# Patient Record
Sex: MALE | Race: WHITE
[De-identification: names, ages, dates, MRNs, and addresses within clinical notes are randomized per-mention and may not be internally consistent; named-entity substitution may affect disease eponyms.]

---

## 2018-02-12 ENCOUNTER — HOSPITAL ENCOUNTER (INPATIENT)
Dept: HOSPITAL 17 - NEPI | Age: 43
LOS: 9 days | Discharge: HOME HEALTH | DRG: 492 | End: 2018-02-21
Attending: SURGERY | Admitting: SURGERY
Payer: COMMERCIAL

## 2018-02-12 VITALS — WEIGHT: 187.39 LBS | BODY MASS INDEX: 26.83 KG/M2 | HEIGHT: 70 IN

## 2018-02-12 VITALS — OXYGEN SATURATION: 100 %
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
## 2018-02-12 DIAGNOSIS — S82.831B: ICD-10-CM

## 2018-02-12 DIAGNOSIS — S42.021A: ICD-10-CM
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
## 2018-02-12 DIAGNOSIS — I95.9: ICD-10-CM
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
## 2018-02-12 DIAGNOSIS — Z59.0: ICD-10-CM

## 2018-02-12 DIAGNOSIS — S82.391B: Primary | ICD-10-CM

## 2018-02-12 LAB
BASOPHILS # BLD AUTO: 0.1 TH/MM3 (ref 0–0.2)
BASOPHILS NFR BLD: 0.7 % (ref 0–2)
EOSINOPHIL # BLD: 0.1 TH/MM3 (ref 0–0.4)
EOSINOPHIL NFR BLD: 1.3 % (ref 0–4)
ERYTHROCYTE [DISTWIDTH] IN BLOOD BY AUTOMATED COUNT: 14.1 % (ref 11.6–17.2)
HCT VFR BLD CALC: 40.4 % (ref 39–51)
HGB BLD-MCNC: 14.2 GM/DL (ref 13–17)
INR PPP: 1 RATIO
LYMPHOCYTES # BLD AUTO: 3.9 TH/MM3 (ref 1–4.8)
LYMPHOCYTES NFR BLD AUTO: 37.2 % (ref 9–44)
MCH RBC QN AUTO: 31.2 PG (ref 27–34)
MCHC RBC AUTO-ENTMCNC: 35 % (ref 32–36)
MCV RBC AUTO: 89.1 FL (ref 80–100)
MONOCYTE #: 1 TH/MM3 (ref 0–0.9)
MONOCYTES NFR BLD: 9.7 % (ref 0–8)
NEUTROPHILS # BLD AUTO: 5.3 TH/MM3 (ref 1.8–7.7)
NEUTROPHILS NFR BLD AUTO: 51.1 % (ref 16–70)
PLATELET # BLD: 195 TH/MM3 (ref 150–450)
PMV BLD AUTO: 8 FL (ref 7–11)
PROTHROMBIN TIME: 10.4 SEC (ref 9.8–11.6)
RBC # BLD AUTO: 4.54 MIL/MM3 (ref 4.5–5.9)
WBC # BLD AUTO: 10.4 TH/MM3 (ref 4–11)

## 2018-02-12 PROCEDURE — 74177 CT ABD & PELVIS W/CONTRAST: CPT

## 2018-02-12 PROCEDURE — G0390 TRAUMA RESPONS W/HOSP CRITI: HCPCS

## 2018-02-12 PROCEDURE — 85730 THROMBOPLASTIN TIME PARTIAL: CPT

## 2018-02-12 PROCEDURE — 85018 HEMOGLOBIN: CPT

## 2018-02-12 PROCEDURE — 70450 CT HEAD/BRAIN W/O DYE: CPT

## 2018-02-12 PROCEDURE — 0QSJXZZ REPOSITION RIGHT FIBULA, EXTERNAL APPROACH: ICD-10-PCS | Performed by: ORTHOPAEDIC SURGERY

## 2018-02-12 PROCEDURE — 94150 VITAL CAPACITY TEST: CPT

## 2018-02-12 PROCEDURE — 86900 BLOOD TYPING SEROLOGIC ABO: CPT

## 2018-02-12 PROCEDURE — 72132 CT LUMBAR SPINE W/DYE: CPT

## 2018-02-12 PROCEDURE — 96374 THER/PROPH/DIAG INJ IV PUSH: CPT

## 2018-02-12 PROCEDURE — 0QSG35Z REPOSITION RIGHT TIBIA WITH EXTERNAL FIXATION DEVICE, PERCUTANEOUS APPROACH: ICD-10-PCS | Performed by: ORTHOPAEDIC SURGERY

## 2018-02-12 PROCEDURE — 75635 CT ANGIO ABDOMINAL ARTERIES: CPT

## 2018-02-12 PROCEDURE — C1713 ANCHOR/SCREW BN/BN,TIS/BN: HCPCS

## 2018-02-12 PROCEDURE — 86901 BLOOD TYPING SEROLOGIC RH(D): CPT

## 2018-02-12 PROCEDURE — 72170 X-RAY EXAM OF PELVIS: CPT

## 2018-02-12 PROCEDURE — 80048 BASIC METABOLIC PNL TOTAL CA: CPT

## 2018-02-12 PROCEDURE — 85025 COMPLETE CBC W/AUTO DIFF WBC: CPT

## 2018-02-12 PROCEDURE — 72129 CT CHEST SPINE W/DYE: CPT

## 2018-02-12 PROCEDURE — 90471 IMMUNIZATION ADMIN: CPT

## 2018-02-12 PROCEDURE — 99291 CRITICAL CARE FIRST HOUR: CPT

## 2018-02-12 PROCEDURE — 85610 PROTHROMBIN TIME: CPT

## 2018-02-12 PROCEDURE — 72125 CT NECK SPINE W/O DYE: CPT

## 2018-02-12 PROCEDURE — 71045 X-RAY EXAM CHEST 1 VIEW: CPT

## 2018-02-12 PROCEDURE — 71260 CT THORAX DX C+: CPT

## 2018-02-12 PROCEDURE — 85014 HEMATOCRIT: CPT

## 2018-02-12 PROCEDURE — 73590 X-RAY EXAM OF LOWER LEG: CPT

## 2018-02-12 PROCEDURE — 96375 TX/PRO/DX INJ NEW DRUG ADDON: CPT

## 2018-02-12 PROCEDURE — 76000 FLUOROSCOPY <1 HR PHYS/QHP: CPT

## 2018-02-12 PROCEDURE — 73020 X-RAY EXAM OF SHOULDER: CPT

## 2018-02-12 PROCEDURE — 73600 X-RAY EXAM OF ANKLE: CPT

## 2018-02-12 PROCEDURE — 86850 RBC ANTIBODY SCREEN: CPT

## 2018-02-12 RX ADMIN — OXYTOCIN SCH MLS/HR: 10 INJECTION, SOLUTION INTRAMUSCULAR; INTRAVENOUS at 22:00

## 2018-02-12 RX ADMIN — HYDROCODONE BITARTRATE AND ACETAMINOPHEN PRN TAB: 10; 325 TABLET ORAL at 23:39

## 2018-02-12 RX ADMIN — CLONIDINE HYDROCHLORIDE SCH MG: 0.1 INJECTION, SOLUTION EPIDURAL at 22:00

## 2018-02-12 RX ADMIN — CEFAZOLIN SODIUM SCH MLS/HR: 2 SOLUTION INTRAVENOUS at 23:00

## 2018-02-12 SDOH — ECONOMIC STABILITY - HOUSING INSECURITY: HOMELESSNESS: Z59.0
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
## 2018-02-12 NOTE — RADRPT
EXAM DATE/TIME:  02/12/2018 21:45 

 

[facility] COMPARISON:     

No previous studies available for comparison.

 

                     

INDICATIONS :     

Ex fix right ankle.

                     

 

MEDICAL HISTORY :     

None.          

 

SURGICAL HISTORY :     

None.   

 

ENCOUNTER:     

Initial                                        

 

ACUITY:     

1 day      

 

PAIN SCORE:     

Non-responsive.

 

LOCATION:     

Right  ankle.

 

FINDINGS:     

There is a comminuted distal tibial fracture and distal fibular fracture with one shaft width displac
ement.

 

CONCLUSION:     

1. Postoperative external fixation across comminuted distal tibia and fibular fractures.

 

 

 

 

 Akin Dominguez MD on February 12, 2018 at 22:40           

Board Certified Radiologist.

 This report was verified electronically.
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
## 2018-02-13 VITALS
RESPIRATION RATE: 17 BRPM | HEART RATE: 73 BPM | DIASTOLIC BLOOD PRESSURE: 79 MMHG | TEMPERATURE: 96.8 F | SYSTOLIC BLOOD PRESSURE: 126 MMHG | OXYGEN SATURATION: 100 %

## 2018-02-13 VITALS
DIASTOLIC BLOOD PRESSURE: 73 MMHG | TEMPERATURE: 95.9 F | OXYGEN SATURATION: 100 % | RESPIRATION RATE: 17 BRPM | SYSTOLIC BLOOD PRESSURE: 117 MMHG | HEART RATE: 73 BPM

## 2018-02-13 VITALS
TEMPERATURE: 96.6 F | OXYGEN SATURATION: 99 % | HEART RATE: 73 BPM | DIASTOLIC BLOOD PRESSURE: 75 MMHG | SYSTOLIC BLOOD PRESSURE: 120 MMHG | RESPIRATION RATE: 17 BRPM

## 2018-02-13 VITALS
SYSTOLIC BLOOD PRESSURE: 117 MMHG | RESPIRATION RATE: 18 BRPM | DIASTOLIC BLOOD PRESSURE: 90 MMHG | HEART RATE: 89 BPM | OXYGEN SATURATION: 97 % | TEMPERATURE: 96.3 F

## 2018-02-13 VITALS — OXYGEN SATURATION: 96 %

## 2018-02-13 VITALS
HEART RATE: 60 BPM | RESPIRATION RATE: 17 BRPM | TEMPERATURE: 96.8 F | OXYGEN SATURATION: 95 % | SYSTOLIC BLOOD PRESSURE: 116 MMHG | DIASTOLIC BLOOD PRESSURE: 70 MMHG

## 2018-02-13 VITALS — OXYGEN SATURATION: 99 %

## 2018-02-13 VITALS
OXYGEN SATURATION: 99 % | TEMPERATURE: 97.3 F | RESPIRATION RATE: 17 BRPM | HEART RATE: 74 BPM | SYSTOLIC BLOOD PRESSURE: 124 MMHG | DIASTOLIC BLOOD PRESSURE: 72 MMHG

## 2018-02-13 LAB
HCT VFR BLD CALC: 38.7 % (ref 39–51)
HGB BLD-MCNC: 13.1 GM/DL (ref 13–17)

## 2018-02-13 RX ADMIN — CLONIDINE HYDROCHLORIDE SCH MG: 0.1 INJECTION, SOLUTION EPIDURAL at 06:44

## 2018-02-13 RX ADMIN — CLONIDINE HYDROCHLORIDE SCH MG: 0.1 INJECTION, SOLUTION EPIDURAL at 21:44

## 2018-02-13 RX ADMIN — FAMOTIDINE SCH MG: 20 TABLET, FILM COATED ORAL at 08:57

## 2018-02-13 RX ADMIN — CEFAZOLIN SODIUM SCH MLS/HR: 2 SOLUTION INTRAVENOUS at 15:05

## 2018-02-13 RX ADMIN — CLONIDINE HYDROCHLORIDE SCH MG: 0.1 INJECTION, SOLUTION EPIDURAL at 13:13

## 2018-02-13 RX ADMIN — STANDARDIZED SENNA CONCENTRATE AND DOCUSATE SODIUM SCH TAB: 8.6; 5 TABLET, FILM COATED ORAL at 08:58

## 2018-02-13 RX ADMIN — VANCOMYCIN HYDROCHLORIDE SCH MLS/HR: 1 INJECTION, SOLUTION INTRAVENOUS at 00:30

## 2018-02-13 RX ADMIN — ONDANSETRON PRN MG: 2 INJECTION, SOLUTION INTRAMUSCULAR; INTRAVENOUS at 06:44

## 2018-02-13 RX ADMIN — STANDARDIZED SENNA CONCENTRATE AND DOCUSATE SODIUM SCH TAB: 8.6; 5 TABLET, FILM COATED ORAL at 21:34

## 2018-02-13 RX ADMIN — OXYTOCIN SCH MLS/HR: 10 INJECTION, SOLUTION INTRAMUSCULAR; INTRAVENOUS at 23:40

## 2018-02-13 RX ADMIN — ACETAMINOPHEN SCH MLS/HR: 10 INJECTION, SOLUTION INTRAVENOUS at 22:27

## 2018-02-13 RX ADMIN — CEFAZOLIN SODIUM SCH MLS/HR: 2 SOLUTION INTRAVENOUS at 06:44

## 2018-02-13 RX ADMIN — VANCOMYCIN HYDROCHLORIDE SCH MLS/HR: 1 INJECTION, SOLUTION INTRAVENOUS at 13:12

## 2018-02-13 RX ADMIN — HYDROCODONE BITARTRATE AND ACETAMINOPHEN PRN TAB: 10; 325 TABLET ORAL at 21:44

## 2018-02-13 RX ADMIN — VANCOMYCIN HYDROCHLORIDE SCH MLS/HR: 1 INJECTION, SOLUTION INTRAVENOUS at 23:40

## 2018-02-13 RX ADMIN — ACETAMINOPHEN SCH MLS/HR: 10 INJECTION, SOLUTION INTRAVENOUS at 15:03

## 2018-02-13 RX ADMIN — ONDANSETRON PRN MG: 2 INJECTION, SOLUTION INTRAMUSCULAR; INTRAVENOUS at 02:01

## 2018-02-13 RX ADMIN — MAGNESIUM HYDROXIDE SCH ML: 400 SUSPENSION ORAL at 08:57

## 2018-02-13 RX ADMIN — OXYTOCIN SCH MLS/HR: 10 INJECTION, SOLUTION INTRAMUSCULAR; INTRAVENOUS at 08:58

## 2018-02-13 RX ADMIN — CEFAZOLIN SODIUM SCH MLS/HR: 2 SOLUTION INTRAVENOUS at 21:44

## 2018-02-13 RX ADMIN — FAMOTIDINE SCH MG: 20 TABLET, FILM COATED ORAL at 21:43

## 2018-02-13 RX ADMIN — MAGNESIUM HYDROXIDE SCH ML: 400 SUSPENSION ORAL at 21:44

## 2018-02-13 RX ADMIN — HYDROCODONE BITARTRATE AND ACETAMINOPHEN PRN TAB: 10; 325 TABLET ORAL at 17:38
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
## 2018-02-13 NOTE — PD.ORT.PN
Subjective


Subjective Remarks


POD 1 s/p I&D with application of exfix right open distal tib fib fxs


s/p right clavicle fx


repots severe nausea.





Objective


Vitals





Vital Signs








  Date Time  Temp Pulse Resp B/P (MAP) Pulse Ox O2 Delivery O2 Flow Rate FiO2


 


2/13/18 05:05 96.8 60 17 116/70 (85) 95   


 


2/13/18 00:15 96.3 89 18 117/90 (99) 97   


 


2/12/18 23:45  50 15 122/78 (93) 99 Room Air  


 


2/12/18 23:15  83 17 125/83 (97) 99 Room Air  


 


2/12/18 23:00  98 15 128/78 (95) 99 Room Air  


 


2/12/18 22:45  98 15 112/71 (85) 99 Room Air  


 


2/12/18 22:30 97.9 100 15 131/76 (94) 99 Nasal Cannula 2 


 


2/12/18 22:20 97.9 100 15 126/70 (88) 99 Nasal Cannula 2 


 


2/12/18 22:19     100  2.00 














I/O      


 


 2/12/18 2/12/18 2/12/18 2/13/18 2/13/18 2/13/18





 07:00 15:00 23:00 07:00 15:00 23:00


 


Intake Total   1500 ml 480 ml  


 


Output Total   100 ml 425 ml  


 


Balance   1400 ml 55 ml  


 


      


 


Intake Oral    480 ml  


 


Other   1500 ml   


 


Output Urine Total   0 ml 425 ml  


 


Estimated Blood Loss   100 ml   


 


# Voids   0   


 


# Bowel Movements    0  








Result Diagram:  


2/12/18 1920





Other Results





Laboratory Tests








Test


  2/12/18


19:20


 


Prothromb Time International


Ratio 1.0 RATIO 


 


 


Prothrombin Time


  10.4 SEC


(9.8-11.6)








Imaging





Last 24 hours Impressions








Aorta w/Runoff CTA 2/12/18 1937 Signed





Impressions: 





 Service Date/Time:  Monday, February 12, 2018 19:44 - CONCLUSION:  1. 

Posterior 





 tibial artery appears to be occluded by a bone fragment. There is questionable 





 trace flow in dorsalis pedis artery. Peroneal artery not well visualized. Exam 





 is limited by poor contrast opacification in the distal lower extremities. 





 Results therefore not definitive. Normal flow noted above the fracture on the 





 right. Left side normal.     Akin Dominguez MD 


 


Thoracic Spine CT 2/12/18 1920 Signed





Impressions: 





 Service Date/Time:  Monday, February 12, 2018 19:44 - CONCLUSION:  1. No acute 





 findings identified within the thoracic spine.     Akin Dominguez MD 


 


Pelvis X-Ray 2/12/18 1920 Signed





Impressions: 





 Service Date/Time:  Monday, February 12, 2018 19:18 - CONCLUSION:  1. No acute 





 findings.     Akin Dominguez MD 


 


Lumbar Spine CT 2/12/18 1920 Signed





Impressions: 





 Service Date/Time:  Monday, February 12, 2018 19:44 - CONCLUSION:  1. No acute 





 fracture. Mild central canal stenosis at L4-5 with broad-based mild disc 





 protrusion. No spondylolisthesis.     Akin Dominguez MD 


 


Head CT 2/12/18 1920 Signed





Impressions: 





 Service Date/Time:  Monday, February 12, 2018 19:36 - CONCLUSION:  1. No acute 





 intracranial abnormality. Fluid in the paranasal sinuses.     Akin Dominguez MD 


 


Chest CT 2/12/18 1920 Signed





Impressions: 





 Service Date/Time:  Monday, February 12, 2018 19:44 - CONCLUSION:  1. Right 





 clavicle fracture. Otherwise negative for intrathoracic traumatic injury.     





 Akin Dominguez MD 


 


Cervical Spine CT 2/12/18 1920 Signed





Impressions: 





 Service Date/Time:  Monday, February 12, 2018 19:36 - CONCLUSION:  1. No acute 





 findings.     Akin Dominguez MD 


 


Abdomen/Pelvis CT 2/12/18 1920 Signed





Impressions: 





 Service Date/Time:  Monday, February 12, 2018 19:44 - CONCLUSION:  1. Negative 





 for acute traumatic injury within the abdomen and pelvis.     Akin Dominguez MD 








Objective Remarks


RLE: dressings around proximal pin sites are blood soaked. remaining dressings 

are clean and dry. intact. NVI. 


RUE: moderate pain overmidshaft clavicle. nvi





Assessment & Plan


Assessment and Plan


1) Right Midshaft clavicle fx - nonop


   -NWB


   -will monitor and treat nonop





2) Right distal Tibfib Fxs s/p I&D and exfix application - POD 1


   -NWB


   -elevate


   -Toradol


   -pin care BID


   -npo after MN


   -will re-eval for surgery tomorrow AM











Jalil Jacobs/First Assist PA Feb 13, 2018 07:23
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
## 2018-02-14 VITALS
HEART RATE: 76 BPM | RESPIRATION RATE: 17 BRPM | TEMPERATURE: 97.2 F | SYSTOLIC BLOOD PRESSURE: 117 MMHG | OXYGEN SATURATION: 98 % | DIASTOLIC BLOOD PRESSURE: 67 MMHG

## 2018-02-14 VITALS
HEART RATE: 69 BPM | TEMPERATURE: 97 F | SYSTOLIC BLOOD PRESSURE: 128 MMHG | DIASTOLIC BLOOD PRESSURE: 77 MMHG | OXYGEN SATURATION: 99 % | RESPIRATION RATE: 17 BRPM

## 2018-02-14 VITALS
SYSTOLIC BLOOD PRESSURE: 133 MMHG | TEMPERATURE: 97.1 F | DIASTOLIC BLOOD PRESSURE: 74 MMHG | OXYGEN SATURATION: 97 % | HEART RATE: 68 BPM | RESPIRATION RATE: 18 BRPM

## 2018-02-14 VITALS
OXYGEN SATURATION: 98 % | RESPIRATION RATE: 17 BRPM | DIASTOLIC BLOOD PRESSURE: 67 MMHG | SYSTOLIC BLOOD PRESSURE: 126 MMHG | TEMPERATURE: 96.9 F | HEART RATE: 76 BPM

## 2018-02-14 VITALS
OXYGEN SATURATION: 97 % | RESPIRATION RATE: 19 BRPM | TEMPERATURE: 97.8 F | DIASTOLIC BLOOD PRESSURE: 67 MMHG | SYSTOLIC BLOOD PRESSURE: 128 MMHG | HEART RATE: 76 BPM

## 2018-02-14 VITALS
OXYGEN SATURATION: 97 % | DIASTOLIC BLOOD PRESSURE: 73 MMHG | HEART RATE: 70 BPM | TEMPERATURE: 96.5 F | RESPIRATION RATE: 17 BRPM | SYSTOLIC BLOOD PRESSURE: 121 MMHG

## 2018-02-14 VITALS
HEART RATE: 63 BPM | DIASTOLIC BLOOD PRESSURE: 65 MMHG | SYSTOLIC BLOOD PRESSURE: 115 MMHG | OXYGEN SATURATION: 98 % | RESPIRATION RATE: 17 BRPM | TEMPERATURE: 96.2 F

## 2018-02-14 VITALS — OXYGEN SATURATION: 97 %

## 2018-02-14 VITALS — OXYGEN SATURATION: 98 %

## 2018-02-14 LAB
BASOPHILS # BLD AUTO: 0.1 TH/MM3 (ref 0–0.2)
BASOPHILS NFR BLD: 0.8 % (ref 0–2)
BUN SERPL-MCNC: 9 MG/DL (ref 7–18)
CALCIUM SERPL-MCNC: 8.2 MG/DL (ref 8.5–10.1)
CHLORIDE SERPL-SCNC: 105 MEQ/L (ref 98–107)
CREAT SERPL-MCNC: 0.93 MG/DL (ref 0.6–1.3)
EOSINOPHIL # BLD: 0 TH/MM3 (ref 0–0.4)
EOSINOPHIL NFR BLD: 0.5 % (ref 0–4)
ERYTHROCYTE [DISTWIDTH] IN BLOOD BY AUTOMATED COUNT: 13.9 % (ref 11.6–17.2)
GFR SERPLBLD BASED ON 1.73 SQ M-ARVRAT: 89 ML/MIN (ref 89–?)
GLUCOSE SERPL-MCNC: 99 MG/DL (ref 74–106)
HCO3 BLD-SCNC: 28.1 MEQ/L (ref 21–32)
HCT VFR BLD CALC: 34.7 % (ref 39–51)
HGB BLD-MCNC: 11.8 GM/DL (ref 13–17)
LYMPHOCYTES # BLD AUTO: 2.8 TH/MM3 (ref 1–4.8)
LYMPHOCYTES NFR BLD AUTO: 34.3 % (ref 9–44)
MCH RBC QN AUTO: 31 PG (ref 27–34)
MCHC RBC AUTO-ENTMCNC: 34.1 % (ref 32–36)
MCV RBC AUTO: 91 FL (ref 80–100)
MONOCYTE #: 1 TH/MM3 (ref 0–0.9)
MONOCYTES NFR BLD: 12.6 % (ref 0–8)
NEUTROPHILS # BLD AUTO: 4.2 TH/MM3 (ref 1.8–7.7)
NEUTROPHILS NFR BLD AUTO: 51.8 % (ref 16–70)
PLATELET # BLD: 172 TH/MM3 (ref 150–450)
PMV BLD AUTO: 8.3 FL (ref 7–11)
RBC # BLD AUTO: 3.82 MIL/MM3 (ref 4.5–5.9)
SODIUM SERPL-SCNC: 139 MEQ/L (ref 136–145)
WBC # BLD AUTO: 8 TH/MM3 (ref 4–11)

## 2018-02-14 RX ADMIN — HYDROCODONE BITARTRATE AND ACETAMINOPHEN PRN TAB: 10; 325 TABLET ORAL at 19:48

## 2018-02-14 RX ADMIN — HYDROCODONE BITARTRATE AND ACETAMINOPHEN PRN TAB: 10; 325 TABLET ORAL at 06:33

## 2018-02-14 RX ADMIN — STANDARDIZED SENNA CONCENTRATE AND DOCUSATE SODIUM SCH TAB: 8.6; 5 TABLET, FILM COATED ORAL at 20:49

## 2018-02-14 RX ADMIN — CEFAZOLIN SODIUM SCH MLS/HR: 2 SOLUTION INTRAVENOUS at 16:02

## 2018-02-14 RX ADMIN — MAGNESIUM HYDROXIDE SCH ML: 400 SUSPENSION ORAL at 19:47

## 2018-02-14 RX ADMIN — CLONIDINE HYDROCHLORIDE SCH MG: 0.1 INJECTION, SOLUTION EPIDURAL at 13:12

## 2018-02-14 RX ADMIN — ACETAMINOPHEN SCH MLS/HR: 10 INJECTION, SOLUTION INTRAVENOUS at 06:33

## 2018-02-14 RX ADMIN — HYDROCODONE BITARTRATE AND ACETAMINOPHEN PRN TAB: 10; 325 TABLET ORAL at 22:31

## 2018-02-14 RX ADMIN — OXYTOCIN SCH MLS/HR: 10 INJECTION, SOLUTION INTRAMUSCULAR; INTRAVENOUS at 20:49

## 2018-02-14 RX ADMIN — OXYTOCIN SCH MLS/HR: 10 INJECTION, SOLUTION INTRAMUSCULAR; INTRAVENOUS at 11:30

## 2018-02-14 RX ADMIN — CEFAZOLIN SODIUM SCH MLS/HR: 2 SOLUTION INTRAVENOUS at 05:47

## 2018-02-14 RX ADMIN — ENOXAPARIN SODIUM SCH MG: 30 INJECTION SUBCUTANEOUS at 09:10

## 2018-02-14 RX ADMIN — CLONIDINE HYDROCHLORIDE SCH MG: 0.1 INJECTION, SOLUTION EPIDURAL at 05:47

## 2018-02-14 RX ADMIN — MAGNESIUM HYDROXIDE SCH ML: 400 SUSPENSION ORAL at 09:10

## 2018-02-14 RX ADMIN — STANDARDIZED SENNA CONCENTRATE AND DOCUSATE SODIUM SCH TAB: 8.6; 5 TABLET, FILM COATED ORAL at 09:10

## 2018-02-14 RX ADMIN — FAMOTIDINE SCH MG: 20 TABLET, FILM COATED ORAL at 19:47

## 2018-02-14 RX ADMIN — ENOXAPARIN SODIUM SCH MG: 30 INJECTION SUBCUTANEOUS at 19:50

## 2018-02-14 RX ADMIN — HYDROCODONE BITARTRATE AND ACETAMINOPHEN PRN TAB: 10; 325 TABLET ORAL at 02:36

## 2018-02-14 RX ADMIN — ONDANSETRON PRN MG: 2 INJECTION, SOLUTION INTRAMUSCULAR; INTRAVENOUS at 02:36

## 2018-02-14 RX ADMIN — FAMOTIDINE SCH MG: 20 TABLET, FILM COATED ORAL at 09:10
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
## 2018-02-14 NOTE — PD.ORT.PN
Subjective


Subjective Remarks


POD 2 s/p I&D with application of exfix right open distal tib fib fxs


s/p right clavicle fx


repots that nausea is improving





Objective


Vitals





Vital Signs








  Date Time  Temp Pulse Resp B/P (MAP) Pulse Ox O2 Delivery O2 Flow Rate FiO2


 


2/14/18 04:35 97.2 76 17 117/67 (84) 98   


 


2/14/18 00:50 96.9 76 17 126/67 (86) 98   


 


2/13/18 20:25 96.8 73 17 126/79 (95) 100   


 


2/13/18 17:46     99   21


 


2/13/18 16:00 96.6 73 17 120/75 (90) 99   


 


2/13/18 15:33   17     


 


2/13/18 14:13   16     


 


2/13/18 12:00 97.3 74 17 124/72 (89) 99   


 


2/13/18 08:39     96   21


 


2/13/18 08:00 95.9 73 17 117/73 (88) 100   














I/O      


 


 2/13/18 2/13/18 2/13/18 2/14/18 2/14/18 2/14/18





 07:00 15:00 23:00 07:00 15:00 23:00


 


Intake Total 480 ml 480 ml 480 ml   


 


Output Total 425 ml     


 


Balance 55 ml 480 ml 480 ml   


 


      


 


Intake Oral 480 ml 480 ml 480 ml   


 


Output Urine Total 425 ml     


 


# Voids  3 2   


 


# Bowel Movements 0 0 0   








Result Diagram:  


2/13/18 0733





Imaging





Last 24 hours Impressions








Aorta w/Runoff CTA 2/12/18 1937 Signed





Impressions: 





 Service Date/Time:  Monday, February 12, 2018 19:44 - CONCLUSION:  1. 

Posterior 





 tibial artery appears to be occluded by a bone fragment. There is questionable 





 trace flow in dorsalis pedis artery. Peroneal artery not well visualized. Exam 





 is limited by poor contrast opacification in the distal lower extremities. 





 Results therefore not definitive. Normal flow noted above the fracture on the 





 right. Left side normal.     Akin Dominguez MD 


 


Thoracic Spine CT 2/12/18 1920 Signed





Impressions: 





 Service Date/Time:  Monday, February 12, 2018 19:44 - CONCLUSION:  1. No acute 





 findings identified within the thoracic spine.     Akin Dominguez MD 


 


Pelvis X-Ray 2/12/18 1920 Signed





Impressions: 





 Service Date/Time:  Monday, February 12, 2018 19:18 - CONCLUSION:  1. No acute 





 findings.     Akin Dominguez MD 


 


Lumbar Spine CT 2/12/18 1920 Signed





Impressions: 





 Service Date/Time:  Monday, February 12, 2018 19:44 - CONCLUSION:  1. No acute 





 fracture. Mild central canal stenosis at L4-5 with broad-based mild disc 





 protrusion. No spondylolisthesis.     Akin Dominguez MD 


 


Head CT 2/12/18 1920 Signed





Impressions: 





 Service Date/Time:  Monday, February 12, 2018 19:36 - CONCLUSION:  1. No acute 





 intracranial abnormality. Fluid in the paranasal sinuses.     Akin Dominguez MD 


 


Chest CT 2/12/18 1920 Signed





Impressions: 





 Service Date/Time:  Monday, February 12, 2018 19:44 - CONCLUSION:  1. Right 





 clavicle fracture. Otherwise negative for intrathoracic traumatic injury.     





 Akin Dominguez MD 


 


Cervical Spine CT 2/12/18 1920 Signed





Impressions: 





 Service Date/Time:  Monday, February 12, 2018 19:36 - CONCLUSION:  1. No acute 





 findings.     Akin Dominguez MD 


 


Abdomen/Pelvis CT 2/12/18 1920 Signed





Impressions: 





 Service Date/Time:  Monday, February 12, 2018 19:44 - CONCLUSION:  1. Negative 





 for acute traumatic injury within the abdomen and pelvis.     Akin Dominguez MD 








Objective Remarks


RLE: dressings around proximal pin sites are blood soaked. remaining dressings 

are clean and dry. intact. NVI. 


RUE: moderate pain overmidshaft clavicle. nvi





Assessment & Plan


Assessment and Plan


1) Right Midshaft clavicle fx - nonop


   -NWB


   -will monitor and treat nonop





2) Right distal Tibfib Fxs s/p I&D and exfix application - POD 2


   -NWB


   -elevate


   -Toradol


   -pin care BID


   -resume diet


   -npo after MN


   -will re-eval for surgery again tomorrow AM











Jalil Jacobs/First Assist PA Feb 14, 2018 06:39

## 2018-02-15 VITALS
HEART RATE: 65 BPM | OXYGEN SATURATION: 96 % | RESPIRATION RATE: 17 BRPM | SYSTOLIC BLOOD PRESSURE: 126 MMHG | TEMPERATURE: 96.3 F | DIASTOLIC BLOOD PRESSURE: 81 MMHG

## 2018-02-15 VITALS
TEMPERATURE: 97.1 F | DIASTOLIC BLOOD PRESSURE: 68 MMHG | HEART RATE: 80 BPM | RESPIRATION RATE: 18 BRPM | SYSTOLIC BLOOD PRESSURE: 117 MMHG | OXYGEN SATURATION: 95 %

## 2018-02-15 VITALS
TEMPERATURE: 96.7 F | RESPIRATION RATE: 18 BRPM | DIASTOLIC BLOOD PRESSURE: 72 MMHG | OXYGEN SATURATION: 97 % | HEART RATE: 70 BPM | SYSTOLIC BLOOD PRESSURE: 130 MMHG

## 2018-02-15 VITALS
TEMPERATURE: 96.7 F | DIASTOLIC BLOOD PRESSURE: 70 MMHG | HEART RATE: 70 BPM | SYSTOLIC BLOOD PRESSURE: 117 MMHG | OXYGEN SATURATION: 95 % | RESPIRATION RATE: 17 BRPM

## 2018-02-15 VITALS
TEMPERATURE: 98.9 F | OXYGEN SATURATION: 96 % | HEART RATE: 89 BPM | SYSTOLIC BLOOD PRESSURE: 125 MMHG | DIASTOLIC BLOOD PRESSURE: 84 MMHG | RESPIRATION RATE: 18 BRPM

## 2018-02-15 VITALS
OXYGEN SATURATION: 96 % | TEMPERATURE: 96.8 F | RESPIRATION RATE: 18 BRPM | DIASTOLIC BLOOD PRESSURE: 70 MMHG | SYSTOLIC BLOOD PRESSURE: 115 MMHG | HEART RATE: 63 BPM

## 2018-02-15 VITALS — OXYGEN SATURATION: 98 %

## 2018-02-15 RX ADMIN — CLONIDINE HYDROCHLORIDE SCH MG: 0.1 INJECTION, SOLUTION EPIDURAL at 13:14

## 2018-02-15 RX ADMIN — HYDROCODONE BITARTRATE AND ACETAMINOPHEN PRN TAB: 10; 325 TABLET ORAL at 02:18

## 2018-02-15 RX ADMIN — ONDANSETRON PRN MG: 2 INJECTION, SOLUTION INTRAMUSCULAR; INTRAVENOUS at 18:11

## 2018-02-15 RX ADMIN — GABAPENTIN SCH MG: 300 CAPSULE ORAL at 18:11

## 2018-02-15 RX ADMIN — OXYCODONE HYDROCHLORIDE AND ACETAMINOPHEN PRN TAB: 10; 325 TABLET ORAL at 13:08

## 2018-02-15 RX ADMIN — HYDROCODONE BITARTRATE AND ACETAMINOPHEN PRN TAB: 10; 325 TABLET ORAL at 08:28

## 2018-02-15 RX ADMIN — STANDARDIZED SENNA CONCENTRATE AND DOCUSATE SODIUM SCH TAB: 8.6; 5 TABLET, FILM COATED ORAL at 22:50

## 2018-02-15 RX ADMIN — STANDARDIZED SENNA CONCENTRATE AND DOCUSATE SODIUM SCH TAB: 8.6; 5 TABLET, FILM COATED ORAL at 08:25

## 2018-02-15 RX ADMIN — MAGNESIUM HYDROXIDE SCH ML: 400 SUSPENSION ORAL at 22:50

## 2018-02-15 RX ADMIN — FAMOTIDINE SCH MG: 20 TABLET, FILM COATED ORAL at 22:50

## 2018-02-15 RX ADMIN — ENOXAPARIN SODIUM SCH MG: 30 INJECTION SUBCUTANEOUS at 13:07

## 2018-02-15 RX ADMIN — GABAPENTIN SCH MG: 300 CAPSULE ORAL at 13:07

## 2018-02-15 RX ADMIN — MAGNESIUM HYDROXIDE SCH ML: 400 SUSPENSION ORAL at 08:25

## 2018-02-15 RX ADMIN — CLONIDINE HYDROCHLORIDE SCH MG: 0.1 INJECTION, SOLUTION EPIDURAL at 18:11

## 2018-02-15 RX ADMIN — FAMOTIDINE SCH MG: 20 TABLET, FILM COATED ORAL at 08:25

## 2018-02-15 RX ADMIN — HYDROCODONE BITARTRATE AND ACETAMINOPHEN PRN TAB: 10; 325 TABLET ORAL at 05:59
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
## 2018-02-16 VITALS
RESPIRATION RATE: 18 BRPM | HEART RATE: 70 BPM | SYSTOLIC BLOOD PRESSURE: 122 MMHG | DIASTOLIC BLOOD PRESSURE: 70 MMHG | OXYGEN SATURATION: 99 % | TEMPERATURE: 96.6 F

## 2018-02-16 VITALS
DIASTOLIC BLOOD PRESSURE: 78 MMHG | SYSTOLIC BLOOD PRESSURE: 133 MMHG | HEART RATE: 85 BPM | OXYGEN SATURATION: 97 % | TEMPERATURE: 96.8 F | RESPIRATION RATE: 18 BRPM

## 2018-02-16 VITALS
RESPIRATION RATE: 16 BRPM | OXYGEN SATURATION: 100 % | TEMPERATURE: 97.4 F | SYSTOLIC BLOOD PRESSURE: 117 MMHG | HEART RATE: 61 BPM | DIASTOLIC BLOOD PRESSURE: 69 MMHG

## 2018-02-16 VITALS
OXYGEN SATURATION: 97 % | SYSTOLIC BLOOD PRESSURE: 134 MMHG | RESPIRATION RATE: 17 BRPM | DIASTOLIC BLOOD PRESSURE: 82 MMHG | TEMPERATURE: 97.3 F | HEART RATE: 68 BPM

## 2018-02-16 VITALS
TEMPERATURE: 98.1 F | RESPIRATION RATE: 20 BRPM | HEART RATE: 74 BPM | SYSTOLIC BLOOD PRESSURE: 144 MMHG | DIASTOLIC BLOOD PRESSURE: 84 MMHG | OXYGEN SATURATION: 97 %

## 2018-02-16 LAB
BASOPHILS # BLD AUTO: 0 TH/MM3 (ref 0–0.2)
BASOPHILS NFR BLD: 0.3 % (ref 0–2)
BUN SERPL-MCNC: 12 MG/DL (ref 7–18)
CALCIUM SERPL-MCNC: 8.5 MG/DL (ref 8.5–10.1)
CHLORIDE SERPL-SCNC: 103 MEQ/L (ref 98–107)
CREAT SERPL-MCNC: 1.26 MG/DL (ref 0.6–1.3)
EOSINOPHIL # BLD: 0.1 TH/MM3 (ref 0–0.4)
EOSINOPHIL NFR BLD: 0.7 % (ref 0–4)
ERYTHROCYTE [DISTWIDTH] IN BLOOD BY AUTOMATED COUNT: 13.7 % (ref 11.6–17.2)
GFR SERPLBLD BASED ON 1.73 SQ M-ARVRAT: 63 ML/MIN (ref 89–?)
GLUCOSE SERPL-MCNC: 117 MG/DL (ref 74–106)
HCO3 BLD-SCNC: 30.1 MEQ/L (ref 21–32)
HCT VFR BLD CALC: 37.5 % (ref 39–51)
HGB BLD-MCNC: 13.1 GM/DL (ref 13–17)
LYMPHOCYTES # BLD AUTO: 1.1 TH/MM3 (ref 1–4.8)
LYMPHOCYTES NFR BLD AUTO: 10.6 % (ref 9–44)
MCH RBC QN AUTO: 31.5 PG (ref 27–34)
MCHC RBC AUTO-ENTMCNC: 35 % (ref 32–36)
MCV RBC AUTO: 90 FL (ref 80–100)
MONOCYTE #: 1.2 TH/MM3 (ref 0–0.9)
MONOCYTES NFR BLD: 11.7 % (ref 0–8)
NEUTROPHILS # BLD AUTO: 8.1 TH/MM3 (ref 1.8–7.7)
NEUTROPHILS NFR BLD AUTO: 76.7 % (ref 16–70)
PLATELET # BLD: 209 TH/MM3 (ref 150–450)
PMV BLD AUTO: 8.3 FL (ref 7–11)
RBC # BLD AUTO: 4.17 MIL/MM3 (ref 4.5–5.9)
SODIUM SERPL-SCNC: 138 MEQ/L (ref 136–145)
WBC # BLD AUTO: 10.6 TH/MM3 (ref 4–11)

## 2018-02-16 RX ADMIN — CLONIDINE HYDROCHLORIDE SCH MG: 0.1 INJECTION, SOLUTION EPIDURAL at 06:19

## 2018-02-16 RX ADMIN — CLONIDINE HYDROCHLORIDE SCH MG: 0.1 INJECTION, SOLUTION EPIDURAL at 01:03

## 2018-02-16 RX ADMIN — ENOXAPARIN SODIUM SCH MG: 30 INJECTION SUBCUTANEOUS at 08:44

## 2018-02-16 RX ADMIN — ENOXAPARIN SODIUM SCH MG: 30 INJECTION SUBCUTANEOUS at 20:14

## 2018-02-16 RX ADMIN — CLONIDINE HYDROCHLORIDE SCH MG: 0.1 INJECTION, SOLUTION EPIDURAL at 12:56

## 2018-02-16 RX ADMIN — OXYCODONE HYDROCHLORIDE AND ACETAMINOPHEN PRN TAB: 10; 325 TABLET ORAL at 08:45

## 2018-02-16 RX ADMIN — CLONIDINE HYDROCHLORIDE SCH MG: 0.1 INJECTION, SOLUTION EPIDURAL at 16:38

## 2018-02-16 RX ADMIN — ENOXAPARIN SODIUM SCH MG: 30 INJECTION SUBCUTANEOUS at 01:00

## 2018-02-16 RX ADMIN — MAGNESIUM HYDROXIDE SCH ML: 400 SUSPENSION ORAL at 08:43

## 2018-02-16 RX ADMIN — STANDARDIZED SENNA CONCENTRATE AND DOCUSATE SODIUM SCH TAB: 8.6; 5 TABLET, FILM COATED ORAL at 20:14

## 2018-02-16 RX ADMIN — MAGNESIUM HYDROXIDE SCH ML: 400 SUSPENSION ORAL at 20:17

## 2018-02-16 RX ADMIN — GABAPENTIN SCH MG: 300 CAPSULE ORAL at 12:55

## 2018-02-16 RX ADMIN — GABAPENTIN SCH MG: 300 CAPSULE ORAL at 16:38

## 2018-02-16 RX ADMIN — FAMOTIDINE SCH MG: 20 TABLET, FILM COATED ORAL at 08:43

## 2018-02-16 RX ADMIN — STANDARDIZED SENNA CONCENTRATE AND DOCUSATE SODIUM SCH TAB: 8.6; 5 TABLET, FILM COATED ORAL at 08:43

## 2018-02-16 RX ADMIN — FAMOTIDINE SCH MG: 20 TABLET, FILM COATED ORAL at 20:14

## 2018-02-16 RX ADMIN — GABAPENTIN SCH MG: 300 CAPSULE ORAL at 08:43
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
## 2018-02-16 NOTE — PD.ORT.PN
Subjective


Subjective Remarks


POD 4 s/p I&D with application of exfix right open distal tib fib fxs


s/p right clavicle fx


no changes





Objective


Vitals





Vital Signs








  Date Time  Temp Pulse Resp B/P (MAP) Pulse Ox O2 Delivery O2 Flow Rate FiO2


 


2/16/18 05:23 96.8 85 18 133/78 (96) 97   


 


2/15/18 23:09 98.9 89 18 125/84 (98) 96   


 


2/15/18 20:05 97.1 80 18 117/68 (84) 95   


 


2/15/18 16:11 96.7 70 17 117/70 (86) 95   


 


2/15/18 12:00 96.8 63 18 115/70 (85) 96   


 


2/15/18 08:10     98   21


 


2/15/18 08:00 96.3 65 17 126/81 (96) 96   














I/O      


 


 2/15/18 2/15/18 2/15/18 2/16/18 2/16/18 2/16/18





 07:00 15:00 23:00 07:00 15:00 23:00


 


Intake Total 360 ml 960 ml 360 ml 0 ml  


 


Output Total 850 ml 650 ml  1000 ml  


 


Balance -490 ml 310 ml 360 ml -1000 ml  


 


      


 


Intake Oral 360 ml 960 ml 360 ml 0 ml  


 


Output Urine Total 850 ml 650 ml  1000 ml  


 


# Voids   3   


 


# Bowel Movements 0 0 0 0  








Result Diagram:  


2/14/18 0743                                                                   

             2/14/18 0743





Imaging





Last 24 hours Impressions








Aorta w/Runoff CTA 2/12/18 1937 Signed





Impressions: 





 Service Date/Time:  Monday, February 12, 2018 19:44 - CONCLUSION:  1. 

Posterior 





 tibial artery appears to be occluded by a bone fragment. There is questionable 





 trace flow in dorsalis pedis artery. Peroneal artery not well visualized. Exam 





 is limited by poor contrast opacification in the distal lower extremities. 





 Results therefore not definitive. Normal flow noted above the fracture on the 





 right. Left side normal.     Akin Dominguez MD 


 


Thoracic Spine CT 2/12/18 1920 Signed





Impressions: 





 Service Date/Time:  Monday, February 12, 2018 19:44 - CONCLUSION:  1. No acute 





 findings identified within the thoracic spine.     Akin Dominguez MD 


 


Pelvis X-Ray 2/12/18 1920 Signed





Impressions: 





 Service Date/Time:  Monday, February 12, 2018 19:18 - CONCLUSION:  1. No acute 





 findings.     Akin Dominguez MD 


 


Lumbar Spine CT 2/12/18 1920 Signed





Impressions: 





 Service Date/Time:  Monday, February 12, 2018 19:44 - CONCLUSION:  1. No acute 





 fracture. Mild central canal stenosis at L4-5 with broad-based mild disc 





 protrusion. No spondylolisthesis.     Akin Dominguez MD 


 


Head CT 2/12/18 1920 Signed





Impressions: 





 Service Date/Time:  Monday, February 12, 2018 19:36 - CONCLUSION:  1. No acute 





 intracranial abnormality. Fluid in the paranasal sinuses.     Akin Dominguez MD 


 


Chest CT 2/12/18 1920 Signed





Impressions: 





 Service Date/Time:  Monday, February 12, 2018 19:44 - CONCLUSION:  1. Right 





 clavicle fracture. Otherwise negative for intrathoracic traumatic injury.     





 Akin Dominguez MD 


 


Cervical Spine CT 2/12/18 1920 Signed





Impressions: 





 Service Date/Time:  Monday, February 12, 2018 19:36 - CONCLUSION:  1. No acute 





 findings.     Akin Dominguez MD 


 


Abdomen/Pelvis CT 2/12/18 1920 Signed





Impressions: 





 Service Date/Time:  Monday, February 12, 2018 19:44 - CONCLUSION:  1. Negative 





 for acute traumatic injury within the abdomen and pelvis.     Akin Dominguez MD 








Objective Remarks


RLE: dressings around proximal pin sites are blood soaked. remaining dressings 

are clean and dry. intact. NVI. 2-3+swelling of ankle


RUE: moderate pain overmidshaft clavicle. nvi





Assessment & Plan


Assessment and Plan


1) Right Midshaft clavicle fx - nonop


   -NWB


   -will monitor and treat nonop





2) Right distal Tibfib Fxs s/p I&D and exfix application - POD 4


   -NWB


   -elevate


   -Toradol


   -pin care BID


   -at this point, swelling too great for surgery. will be at least 1-2 weeks 

before surgery possibile due to swelling. if patient is able, ok for DC home 

and follow up in office. had lengthy discussion with patient regarding this 

plan adn the importance of keeping his foot elevated and refraining from 

smoking. patient is unsure if he will have suitable arrangements to accommodate 

this.  If unable to make arrangements, ok to stay in hospital. but if 

arrangements made, cleared for DC home and follow up next week in office











Jalil Jacobs PA/First Assist PA Feb 16, 2018 06:47

## 2018-02-17 VITALS
OXYGEN SATURATION: 99 % | DIASTOLIC BLOOD PRESSURE: 78 MMHG | SYSTOLIC BLOOD PRESSURE: 129 MMHG | RESPIRATION RATE: 18 BRPM | TEMPERATURE: 95.9 F | HEART RATE: 67 BPM

## 2018-02-17 VITALS
OXYGEN SATURATION: 97 % | DIASTOLIC BLOOD PRESSURE: 82 MMHG | TEMPERATURE: 96.5 F | SYSTOLIC BLOOD PRESSURE: 153 MMHG | HEART RATE: 65 BPM | RESPIRATION RATE: 18 BRPM

## 2018-02-17 VITALS
HEART RATE: 69 BPM | OXYGEN SATURATION: 100 % | DIASTOLIC BLOOD PRESSURE: 73 MMHG | TEMPERATURE: 96.8 F | RESPIRATION RATE: 17 BRPM | SYSTOLIC BLOOD PRESSURE: 139 MMHG

## 2018-02-17 VITALS
TEMPERATURE: 97.1 F | OXYGEN SATURATION: 96 % | RESPIRATION RATE: 16 BRPM | SYSTOLIC BLOOD PRESSURE: 140 MMHG | DIASTOLIC BLOOD PRESSURE: 79 MMHG | HEART RATE: 73 BPM

## 2018-02-17 VITALS
SYSTOLIC BLOOD PRESSURE: 132 MMHG | RESPIRATION RATE: 18 BRPM | TEMPERATURE: 96.9 F | HEART RATE: 65 BPM | OXYGEN SATURATION: 99 % | DIASTOLIC BLOOD PRESSURE: 80 MMHG

## 2018-02-17 VITALS — OXYGEN SATURATION: 99 %

## 2018-02-17 RX ADMIN — GABAPENTIN SCH MG: 300 CAPSULE ORAL at 19:10

## 2018-02-17 RX ADMIN — CLONIDINE HYDROCHLORIDE SCH MG: 0.1 INJECTION, SOLUTION EPIDURAL at 19:11

## 2018-02-17 RX ADMIN — ENOXAPARIN SODIUM SCH MG: 30 INJECTION SUBCUTANEOUS at 08:44

## 2018-02-17 RX ADMIN — GABAPENTIN SCH MG: 300 CAPSULE ORAL at 08:49

## 2018-02-17 RX ADMIN — MAGNESIUM HYDROXIDE SCH ML: 400 SUSPENSION ORAL at 21:00

## 2018-02-17 RX ADMIN — MAGNESIUM HYDROXIDE SCH ML: 400 SUSPENSION ORAL at 08:43

## 2018-02-17 RX ADMIN — GABAPENTIN SCH MG: 300 CAPSULE ORAL at 12:50

## 2018-02-17 RX ADMIN — STANDARDIZED SENNA CONCENTRATE AND DOCUSATE SODIUM SCH TAB: 8.6; 5 TABLET, FILM COATED ORAL at 08:49

## 2018-02-17 RX ADMIN — OXYCODONE HYDROCHLORIDE AND ACETAMINOPHEN PRN TAB: 10; 325 TABLET ORAL at 08:50

## 2018-02-17 RX ADMIN — CLONIDINE HYDROCHLORIDE SCH MG: 0.1 INJECTION, SOLUTION EPIDURAL at 05:25

## 2018-02-17 RX ADMIN — FAMOTIDINE SCH MG: 20 TABLET, FILM COATED ORAL at 08:49

## 2018-02-17 RX ADMIN — CLONIDINE HYDROCHLORIDE SCH MG: 0.1 INJECTION, SOLUTION EPIDURAL at 23:59

## 2018-02-17 RX ADMIN — ENOXAPARIN SODIUM SCH MG: 30 INJECTION SUBCUTANEOUS at 21:33

## 2018-02-17 RX ADMIN — STANDARDIZED SENNA CONCENTRATE AND DOCUSATE SODIUM SCH TAB: 8.6; 5 TABLET, FILM COATED ORAL at 21:00

## 2018-02-17 RX ADMIN — FAMOTIDINE SCH MG: 20 TABLET, FILM COATED ORAL at 21:32

## 2018-02-17 RX ADMIN — OXYCODONE HYDROCHLORIDE AND ACETAMINOPHEN PRN TAB: 10; 325 TABLET ORAL at 05:22

## 2018-02-17 RX ADMIN — CLONIDINE HYDROCHLORIDE SCH MG: 0.1 INJECTION, SOLUTION EPIDURAL at 00:09
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
## 2018-02-18 VITALS
TEMPERATURE: 96.3 F | HEART RATE: 98 BPM | OXYGEN SATURATION: 98 % | RESPIRATION RATE: 18 BRPM | DIASTOLIC BLOOD PRESSURE: 81 MMHG | SYSTOLIC BLOOD PRESSURE: 138 MMHG

## 2018-02-18 VITALS
TEMPERATURE: 97.3 F | SYSTOLIC BLOOD PRESSURE: 127 MMHG | DIASTOLIC BLOOD PRESSURE: 82 MMHG | OXYGEN SATURATION: 98 % | RESPIRATION RATE: 18 BRPM | HEART RATE: 88 BPM

## 2018-02-18 VITALS
TEMPERATURE: 96.8 F | RESPIRATION RATE: 17 BRPM | DIASTOLIC BLOOD PRESSURE: 74 MMHG | HEART RATE: 91 BPM | OXYGEN SATURATION: 100 % | SYSTOLIC BLOOD PRESSURE: 137 MMHG

## 2018-02-18 VITALS
SYSTOLIC BLOOD PRESSURE: 151 MMHG | TEMPERATURE: 96 F | HEART RATE: 78 BPM | RESPIRATION RATE: 18 BRPM | DIASTOLIC BLOOD PRESSURE: 80 MMHG | OXYGEN SATURATION: 98 %

## 2018-02-18 VITALS — OXYGEN SATURATION: 98 %

## 2018-02-18 RX ADMIN — FAMOTIDINE SCH MG: 20 TABLET, FILM COATED ORAL at 20:43

## 2018-02-18 RX ADMIN — MAGNESIUM HYDROXIDE SCH ML: 400 SUSPENSION ORAL at 20:56

## 2018-02-18 RX ADMIN — OXYCODONE HYDROCHLORIDE AND ACETAMINOPHEN PRN TAB: 10; 325 TABLET ORAL at 00:00

## 2018-02-18 RX ADMIN — STANDARDIZED SENNA CONCENTRATE AND DOCUSATE SODIUM SCH TAB: 8.6; 5 TABLET, FILM COATED ORAL at 20:56

## 2018-02-18 RX ADMIN — OXYCODONE HYDROCHLORIDE AND ACETAMINOPHEN PRN TAB: 10; 325 TABLET ORAL at 09:44

## 2018-02-18 RX ADMIN — CLONIDINE HYDROCHLORIDE SCH MG: 0.1 INJECTION, SOLUTION EPIDURAL at 12:16

## 2018-02-18 RX ADMIN — GABAPENTIN SCH MG: 300 CAPSULE ORAL at 12:16

## 2018-02-18 RX ADMIN — OXYCODONE HYDROCHLORIDE AND ACETAMINOPHEN PRN TAB: 10; 325 TABLET ORAL at 20:47

## 2018-02-18 RX ADMIN — MAGNESIUM HYDROXIDE SCH ML: 400 SUSPENSION ORAL at 09:41

## 2018-02-18 RX ADMIN — CLONIDINE HYDROCHLORIDE SCH MG: 0.1 INJECTION, SOLUTION EPIDURAL at 05:01

## 2018-02-18 RX ADMIN — ENOXAPARIN SODIUM SCH MG: 30 INJECTION SUBCUTANEOUS at 20:43

## 2018-02-18 RX ADMIN — GABAPENTIN SCH MG: 300 CAPSULE ORAL at 17:38

## 2018-02-18 RX ADMIN — FAMOTIDINE SCH MG: 20 TABLET, FILM COATED ORAL at 09:40

## 2018-02-18 RX ADMIN — ENOXAPARIN SODIUM SCH MG: 30 INJECTION SUBCUTANEOUS at 09:43

## 2018-02-18 RX ADMIN — GABAPENTIN SCH MG: 300 CAPSULE ORAL at 09:40

## 2018-02-18 RX ADMIN — STANDARDIZED SENNA CONCENTRATE AND DOCUSATE SODIUM SCH TAB: 8.6; 5 TABLET, FILM COATED ORAL at 09:40

## 2018-02-18 RX ADMIN — ONDANSETRON PRN MG: 2 INJECTION, SOLUTION INTRAMUSCULAR; INTRAVENOUS at 20:47

## 2018-02-18 RX ADMIN — ONDANSETRON PRN MG: 2 INJECTION, SOLUTION INTRAMUSCULAR; INTRAVENOUS at 12:31
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
## 2018-02-19 VITALS
HEART RATE: 93 BPM | DIASTOLIC BLOOD PRESSURE: 76 MMHG | TEMPERATURE: 96.7 F | SYSTOLIC BLOOD PRESSURE: 118 MMHG | OXYGEN SATURATION: 100 % | RESPIRATION RATE: 18 BRPM

## 2018-02-19 VITALS
RESPIRATION RATE: 17 BRPM | DIASTOLIC BLOOD PRESSURE: 74 MMHG | HEART RATE: 88 BPM | OXYGEN SATURATION: 100 % | TEMPERATURE: 96.7 F | SYSTOLIC BLOOD PRESSURE: 139 MMHG

## 2018-02-19 VITALS
OXYGEN SATURATION: 99 % | HEART RATE: 85 BPM | DIASTOLIC BLOOD PRESSURE: 69 MMHG | SYSTOLIC BLOOD PRESSURE: 115 MMHG | TEMPERATURE: 96.9 F | RESPIRATION RATE: 16 BRPM

## 2018-02-19 VITALS
OXYGEN SATURATION: 100 % | DIASTOLIC BLOOD PRESSURE: 79 MMHG | HEART RATE: 86 BPM | RESPIRATION RATE: 16 BRPM | SYSTOLIC BLOOD PRESSURE: 179 MMHG | TEMPERATURE: 96.3 F

## 2018-02-19 RX ADMIN — FAMOTIDINE SCH MG: 20 TABLET, FILM COATED ORAL at 20:35

## 2018-02-19 RX ADMIN — ONDANSETRON PRN MG: 2 INJECTION, SOLUTION INTRAMUSCULAR; INTRAVENOUS at 12:34

## 2018-02-19 RX ADMIN — OXYCODONE HYDROCHLORIDE AND ACETAMINOPHEN PRN TAB: 10; 325 TABLET ORAL at 20:35

## 2018-02-19 RX ADMIN — ENOXAPARIN SODIUM SCH MG: 30 INJECTION SUBCUTANEOUS at 08:30

## 2018-02-19 RX ADMIN — OXYCODONE HYDROCHLORIDE AND ACETAMINOPHEN PRN TAB: 10; 325 TABLET ORAL at 16:01

## 2018-02-19 RX ADMIN — ENOXAPARIN SODIUM SCH MG: 30 INJECTION SUBCUTANEOUS at 20:36

## 2018-02-19 RX ADMIN — GABAPENTIN SCH MG: 300 CAPSULE ORAL at 12:31

## 2018-02-19 RX ADMIN — OXYCODONE HYDROCHLORIDE AND ACETAMINOPHEN PRN TAB: 10; 325 TABLET ORAL at 12:31

## 2018-02-19 RX ADMIN — GABAPENTIN SCH MG: 300 CAPSULE ORAL at 17:40

## 2018-02-19 RX ADMIN — MAGNESIUM HYDROXIDE SCH ML: 400 SUSPENSION ORAL at 08:29

## 2018-02-19 RX ADMIN — MAGNESIUM HYDROXIDE SCH ML: 400 SUSPENSION ORAL at 20:36

## 2018-02-19 RX ADMIN — FAMOTIDINE SCH MG: 20 TABLET, FILM COATED ORAL at 08:30

## 2018-02-19 RX ADMIN — GABAPENTIN SCH MG: 300 CAPSULE ORAL at 08:30

## 2018-02-19 RX ADMIN — CYCLOBENZAPRINE HYDROCHLORIDE PRN MG: 10 TABLET, FILM COATED ORAL at 20:35

## 2018-02-19 RX ADMIN — OXYCODONE HYDROCHLORIDE AND ACETAMINOPHEN PRN TAB: 10; 325 TABLET ORAL at 08:30

## 2018-02-19 RX ADMIN — OXYCODONE HYDROCHLORIDE AND ACETAMINOPHEN PRN TAB: 10; 325 TABLET ORAL at 03:14

## 2018-02-19 RX ADMIN — STANDARDIZED SENNA CONCENTRATE AND DOCUSATE SODIUM SCH TAB: 8.6; 5 TABLET, FILM COATED ORAL at 08:31

## 2018-02-19 RX ADMIN — STANDARDIZED SENNA CONCENTRATE AND DOCUSATE SODIUM SCH TAB: 8.6; 5 TABLET, FILM COATED ORAL at 20:36

## 2018-02-19 NOTE — PD.ORT.PN
Subjective


Subjective Remarks


Pain controlled. Anxious to progress with surgery. Elevating his foot in bed





Objective


Vitals





Vital Signs








  Date Time  Temp Pulse Resp B/P (MAP) Pulse Ox O2 Delivery O2 Flow Rate FiO2


 


2/18/18 20:32 96.8 91 17 137/74 (95) 100   


 


2/18/18 17:49     98   21


 


2/18/18 16:00 97.3 88 18 127/82 (97) 98   


 


2/18/18 12:00 96.3 98 18 138/81 (100) 98   


 


2/18/18 08:00 96.0 78 18 151/80 (103) 98   














I/O      


 


 2/18/18 2/18/18 2/18/18 2/19/18 2/19/18 2/19/18





 07:00 15:00 23:00 07:00 15:00 23:00


 


Intake Total 480 ml 720 ml 480 ml   


 


Output Total  600 ml    


 


Balance 480 ml 120 ml 480 ml   


 


      


 


Intake Oral 480 ml 720 ml 480 ml   


 


Output Urine Total  600 ml    


 


# Voids 2  2   


 


# Bowel Movements 1 0 0   








Result Diagram:  


2/16/18 0648                                                                   

             2/16/18 0648





Imaging





Last 24 hours Impressions








Aorta w/Runoff CTA 2/12/18 1937 Signed





Impressions: 





 Service Date/Time:  Monday, February 12, 2018 19:44 - CONCLUSION:  1. 

Posterior 





 tibial artery appears to be occluded by a bone fragment. There is questionable 





 trace flow in dorsalis pedis artery. Peroneal artery not well visualized. Exam 





 is limited by poor contrast opacification in the distal lower extremities. 





 Results therefore not definitive. Normal flow noted above the fracture on the 





 right. Left side normal.     Akin Dominguez MD 


 


Thoracic Spine CT 2/12/18 1920 Signed





Impressions: 





 Service Date/Time:  Monday, February 12, 2018 19:44 - CONCLUSION:  1. No acute 





 findings identified within the thoracic spine.     Akin Dominguez MD 


 


Pelvis X-Ray 2/12/18 1920 Signed





Impressions: 





 Service Date/Time:  Monday, February 12, 2018 19:18 - CONCLUSION:  1. No acute 





 findings.     Akin Dominguez MD 


 


Lumbar Spine CT 2/12/18 1920 Signed





Impressions: 





 Service Date/Time:  Monday, February 12, 2018 19:44 - CONCLUSION:  1. No acute 





 fracture. Mild central canal stenosis at L4-5 with broad-based mild disc 





 protrusion. No spondylolisthesis.     Akin Dominguez MD 


 


Head CT 2/12/18 1920 Signed





Impressions: 





 Service Date/Time:  Monday, February 12, 2018 19:36 - CONCLUSION:  1. No acute 





 intracranial abnormality. Fluid in the paranasal sinuses.     Akin Dominguez MD 


 


Chest CT 2/12/18 1920 Signed





Impressions: 





 Service Date/Time:  Monday, February 12, 2018 19:44 - CONCLUSION:  1. Right 





 clavicle fracture. Otherwise negative for intrathoracic traumatic injury.     





 Akin Dominguez MD 


 


Cervical Spine CT 2/12/18 1920 Signed





Impressions: 





 Service Date/Time:  Monday, February 12, 2018 19:36 - CONCLUSION:  1. No acute 





 findings.     Akin Dominguez MD 


 


Abdomen/Pelvis CT 2/12/18 1920 Signed





Impressions: 





 Service Date/Time:  Monday, February 12, 2018 19:44 - CONCLUSION:  1. Negative 





 for acute traumatic injury within the abdomen and pelvis.     Akin Dominguez MD 








Objective Remarks


RLE: dressings around proximal pin sites mild drainage. remaining dressings are 

clean and dry. intact. NVI. 3+swelling of ankle


RUE: moderate pain overmidshaft clavicle. nvi





Assessment & Plan


Assessment and Plan


1) Right Midshaft clavicle fx - nonop


   -NWB


   -will monitor and treat nonop





2) Right distal Tibfib Fxs s/p I&D and exfix application - POD 5


   -NWB


   -elevate


   -Toradol


   -pin care BID


   -at this point, swelling too great for surgery. will be at least 1 week 

before surgery possible due to swelling.











Kristopher Romo Jr. Feb 19, 2018 06:29
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
## 2018-02-20 VITALS
HEART RATE: 80 BPM | OXYGEN SATURATION: 99 % | SYSTOLIC BLOOD PRESSURE: 128 MMHG | RESPIRATION RATE: 18 BRPM | TEMPERATURE: 97 F | DIASTOLIC BLOOD PRESSURE: 83 MMHG

## 2018-02-20 VITALS
RESPIRATION RATE: 18 BRPM | DIASTOLIC BLOOD PRESSURE: 75 MMHG | TEMPERATURE: 96.5 F | OXYGEN SATURATION: 100 % | SYSTOLIC BLOOD PRESSURE: 120 MMHG | HEART RATE: 82 BPM

## 2018-02-20 VITALS
OXYGEN SATURATION: 99 % | HEART RATE: 86 BPM | RESPIRATION RATE: 17 BRPM | SYSTOLIC BLOOD PRESSURE: 120 MMHG | TEMPERATURE: 96.6 F | DIASTOLIC BLOOD PRESSURE: 78 MMHG

## 2018-02-20 VITALS
TEMPERATURE: 97 F | SYSTOLIC BLOOD PRESSURE: 118 MMHG | DIASTOLIC BLOOD PRESSURE: 72 MMHG | HEART RATE: 88 BPM | RESPIRATION RATE: 18 BRPM | OXYGEN SATURATION: 100 %

## 2018-02-20 VITALS — OXYGEN SATURATION: 100 %

## 2018-02-20 RX ADMIN — OXYCODONE HYDROCHLORIDE AND ACETAMINOPHEN PRN TAB: 10; 325 TABLET ORAL at 15:38

## 2018-02-20 RX ADMIN — ENOXAPARIN SODIUM SCH MG: 30 INJECTION SUBCUTANEOUS at 20:15

## 2018-02-20 RX ADMIN — OXYCODONE HYDROCHLORIDE AND ACETAMINOPHEN PRN TAB: 10; 325 TABLET ORAL at 07:52

## 2018-02-20 RX ADMIN — OXYCODONE HYDROCHLORIDE AND ACETAMINOPHEN PRN TAB: 10; 325 TABLET ORAL at 01:31

## 2018-02-20 RX ADMIN — STANDARDIZED SENNA CONCENTRATE AND DOCUSATE SODIUM SCH TAB: 8.6; 5 TABLET, FILM COATED ORAL at 20:15

## 2018-02-20 RX ADMIN — IBUPROFEN SCH MG: 800 TABLET, FILM COATED ORAL at 22:22

## 2018-02-20 RX ADMIN — CYCLOBENZAPRINE HYDROCHLORIDE PRN MG: 10 TABLET, FILM COATED ORAL at 15:38

## 2018-02-20 RX ADMIN — OXYCODONE HYDROCHLORIDE AND ACETAMINOPHEN PRN TAB: 10; 325 TABLET ORAL at 20:12

## 2018-02-20 RX ADMIN — OXYCODONE HYDROCHLORIDE AND ACETAMINOPHEN PRN TAB: 10; 325 TABLET ORAL at 11:46

## 2018-02-20 RX ADMIN — FAMOTIDINE SCH MG: 20 TABLET, FILM COATED ORAL at 20:11

## 2018-02-20 RX ADMIN — GABAPENTIN SCH MG: 300 CAPSULE ORAL at 17:34

## 2018-02-20 RX ADMIN — GABAPENTIN SCH MG: 300 CAPSULE ORAL at 14:19

## 2018-02-20 RX ADMIN — FAMOTIDINE SCH MG: 20 TABLET, FILM COATED ORAL at 07:53

## 2018-02-20 RX ADMIN — ENOXAPARIN SODIUM SCH MG: 30 INJECTION SUBCUTANEOUS at 07:53

## 2018-02-20 RX ADMIN — STANDARDIZED SENNA CONCENTRATE AND DOCUSATE SODIUM SCH TAB: 8.6; 5 TABLET, FILM COATED ORAL at 07:53

## 2018-02-20 RX ADMIN — MAGNESIUM HYDROXIDE SCH ML: 400 SUSPENSION ORAL at 07:53

## 2018-02-20 RX ADMIN — IBUPROFEN SCH MG: 800 TABLET, FILM COATED ORAL at 14:19

## 2018-02-20 RX ADMIN — ONDANSETRON PRN MG: 2 INJECTION, SOLUTION INTRAMUSCULAR; INTRAVENOUS at 18:02

## 2018-02-20 RX ADMIN — GABAPENTIN SCH MG: 300 CAPSULE ORAL at 07:52

## 2018-02-20 RX ADMIN — ONDANSETRON PRN MG: 2 INJECTION, SOLUTION INTRAMUSCULAR; INTRAVENOUS at 01:31

## 2018-02-20 RX ADMIN — MAGNESIUM HYDROXIDE SCH ML: 400 SUSPENSION ORAL at 20:15

## 2018-02-20 RX ADMIN — CYCLOBENZAPRINE HYDROCHLORIDE PRN MG: 10 TABLET, FILM COATED ORAL at 07:52
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
## 2018-02-20 NOTE — PD.ORT.PN
Subjective


Subjective Remarks


Pain controlled. Anxious to progress with surgery. Elevating his foot in bed





Objective


Vitals





Vital Signs








  Date Time  Temp Pulse Resp B/P (MAP) Pulse Ox O2 Delivery O2 Flow Rate FiO2


 


2/20/18 00:00 96.6 86 17 120/78 (92) 99   


 


2/19/18 20:45 96.7 93 18 118/76 (90) 100   


 


2/19/18 16:00 96.3 86 16 179/79 (112) 100   


 


2/19/18 12:00 96.9 85 16 115/69 (84) 99   


 


2/19/18 08:00 96.7 88 17 139/74 (95) 100   














I/O      


 


 2/19/18 2/19/18 2/19/18 2/20/18 2/20/18 2/20/18





 07:00 15:00 23:00 07:00 15:00 23:00


 


Intake Total 0 ml 1200 ml  1500 ml  


 


Output Total  500 ml  1375 ml  


 


Balance 0 ml 700 ml  125 ml  


 


      


 


Intake Oral 0 ml 1200 ml  1500 ml  


 


Output Urine Total  500 ml  1375 ml  


 


# Voids 2 3    


 


# Bowel Movements 0 0  0  








Result Diagram:  


2/16/18 0648                                                                   

             2/16/18 0648





Imaging





Last 24 hours Impressions








Aorta w/Runoff CTA 2/12/18 1937 Signed





Impressions: 





 Service Date/Time:  Monday, February 12, 2018 19:44 - CONCLUSION:  1. 

Posterior 





 tibial artery appears to be occluded by a bone fragment. There is questionable 





 trace flow in dorsalis pedis artery. Peroneal artery not well visualized. Exam 





 is limited by poor contrast opacification in the distal lower extremities. 





 Results therefore not definitive. Normal flow noted above the fracture on the 





 right. Left side normal.     Akin Dominguez MD 


 


Thoracic Spine CT 2/12/18 1920 Signed





Impressions: 





 Service Date/Time:  Monday, February 12, 2018 19:44 - CONCLUSION:  1. No acute 





 findings identified within the thoracic spine.     Akin Dominguez MD 


 


Pelvis X-Ray 2/12/18 1920 Signed





Impressions: 





 Service Date/Time:  Monday, February 12, 2018 19:18 - CONCLUSION:  1. No acute 





 findings.     Akin Dominguez MD 


 


Lumbar Spine CT 2/12/18 1920 Signed





Impressions: 





 Service Date/Time:  Monday, February 12, 2018 19:44 - CONCLUSION:  1. No acute 





 fracture. Mild central canal stenosis at L4-5 with broad-based mild disc 





 protrusion. No spondylolisthesis.     Akin Dominguez MD 


 


Head CT 2/12/18 1920 Signed





Impressions: 





 Service Date/Time:  Monday, February 12, 2018 19:36 - CONCLUSION:  1. No acute 





 intracranial abnormality. Fluid in the paranasal sinuses.     Akin Dominguez MD 


 


Chest CT 2/12/18 1920 Signed





Impressions: 





 Service Date/Time:  Monday, February 12, 2018 19:44 - CONCLUSION:  1. Right 





 clavicle fracture. Otherwise negative for intrathoracic traumatic injury.     





 Akin Dominguez MD 


 


Cervical Spine CT 2/12/18 1920 Signed





Impressions: 





 Service Date/Time:  Monday, February 12, 2018 19:36 - CONCLUSION:  1. No acute 





 findings.     Akin Dominguez MD 


 


Abdomen/Pelvis CT 2/12/18 1920 Signed





Impressions: 





 Service Date/Time:  Monday, February 12, 2018 19:44 - CONCLUSION:  1. Negative 





 for acute traumatic injury within the abdomen and pelvis.     Akin Dominguez MD 








Objective Remarks


RLE: dressings around proximal pin sites mild drainage. remaining dressings are 

clean and dry. intact. NVI. 3+swelling of ankle


RUE: moderate pain overmidshaft clavicle. nvi





Assessment & Plan


Assessment and Plan


1) Right Midshaft clavicle fx - nonop


   -NWB


   -will monitor and treat nonop





2) Right distal Tibfib Fxs s/p I&D and exfix application - POD 5


   -NWB


   -elevate


   -Toradol


   -pin care BID


   -at this point, swelling too great for surgery. will be at least 1 week 

before surgery possible due to swelling. The patient understands the gravity of 

waiting for surgery. He understands there is no other option. Unfortunately he 

is still not happy with the options











Kristopher Romo Jr. Feb 20, 2018 06:30

## 2018-02-21 VITALS
OXYGEN SATURATION: 100 % | DIASTOLIC BLOOD PRESSURE: 80 MMHG | SYSTOLIC BLOOD PRESSURE: 128 MMHG | RESPIRATION RATE: 18 BRPM | HEART RATE: 91 BPM | TEMPERATURE: 96.6 F

## 2018-02-21 VITALS
SYSTOLIC BLOOD PRESSURE: 114 MMHG | HEART RATE: 85 BPM | OXYGEN SATURATION: 100 % | DIASTOLIC BLOOD PRESSURE: 70 MMHG | TEMPERATURE: 96.7 F | RESPIRATION RATE: 18 BRPM

## 2018-02-21 VITALS
RESPIRATION RATE: 17 BRPM | SYSTOLIC BLOOD PRESSURE: 124 MMHG | OXYGEN SATURATION: 98 % | HEART RATE: 90 BPM | TEMPERATURE: 97.7 F | DIASTOLIC BLOOD PRESSURE: 73 MMHG

## 2018-02-21 VITALS — OXYGEN SATURATION: 100 %

## 2018-02-21 RX ADMIN — CYCLOBENZAPRINE HYDROCHLORIDE PRN MG: 10 TABLET, FILM COATED ORAL at 16:46

## 2018-02-21 RX ADMIN — GABAPENTIN SCH MG: 300 CAPSULE ORAL at 10:24

## 2018-02-21 RX ADMIN — FAMOTIDINE SCH MG: 20 TABLET, FILM COATED ORAL at 09:00

## 2018-02-21 RX ADMIN — ENOXAPARIN SODIUM SCH MG: 30 INJECTION SUBCUTANEOUS at 09:01

## 2018-02-21 RX ADMIN — IBUPROFEN SCH MG: 800 TABLET, FILM COATED ORAL at 06:20

## 2018-02-21 RX ADMIN — OXYCODONE HYDROCHLORIDE AND ACETAMINOPHEN PRN TAB: 10; 325 TABLET ORAL at 12:00

## 2018-02-21 RX ADMIN — MAGNESIUM HYDROXIDE SCH ML: 400 SUSPENSION ORAL at 09:00

## 2018-02-21 RX ADMIN — GABAPENTIN SCH MG: 300 CAPSULE ORAL at 09:00

## 2018-02-21 RX ADMIN — STANDARDIZED SENNA CONCENTRATE AND DOCUSATE SODIUM SCH TAB: 8.6; 5 TABLET, FILM COATED ORAL at 09:00

## 2018-02-21 RX ADMIN — OXYCODONE HYDROCHLORIDE AND ACETAMINOPHEN PRN TAB: 10; 325 TABLET ORAL at 02:55

## 2018-02-21 RX ADMIN — OXYCODONE HYDROCHLORIDE AND ACETAMINOPHEN PRN TAB: 10; 325 TABLET ORAL at 16:46

## 2018-02-21 RX ADMIN — GABAPENTIN SCH MG: 300 CAPSULE ORAL at 13:49

## 2018-02-21 RX ADMIN — IBUPROFEN SCH MG: 800 TABLET, FILM COATED ORAL at 13:50

## 2018-02-21 RX ADMIN — CYCLOBENZAPRINE HYDROCHLORIDE PRN MG: 10 TABLET, FILM COATED ORAL at 09:02

## 2018-02-21 RX ADMIN — OXYCODONE HYDROCHLORIDE AND ACETAMINOPHEN PRN TAB: 10; 325 TABLET ORAL at 09:02

## 2018-02-21 NOTE — HHI.FF
Face to Face Verification


Diagnosis:  


(1) Right clavicle fracture


(2) Open fracture of tibia and fibula


Home Health Nursing








Order: Wound care and dressing changes





 Nursing assessment with vital signs








Instructions:


Cleanse pin sites on RLE with Q-tip of half saline and half peroxide mixture 

twice per day. Educate patient and family











I have seen patient Contreras Lawler on 2/21/18. My clinical findings support 

the need for the requested home health care services because:








 Limited ability to care for self














I certify that my clinical findings support that this patient is homebound 

because:








 Post-op weakness

















Wilder Swan Feb 21, 2018 12:53
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
## 2018-03-07 ENCOUNTER — HOSPITAL ENCOUNTER (INPATIENT)
Dept: HOSPITAL 17 - HSDI | Age: 43
LOS: 2 days | Discharge: HOME | DRG: 494 | End: 2018-03-09
Attending: ORTHOPAEDIC SURGERY | Admitting: ORTHOPAEDIC SURGERY
Payer: COMMERCIAL

## 2018-03-07 VITALS
TEMPERATURE: 96.7 F | OXYGEN SATURATION: 96 % | RESPIRATION RATE: 19 BRPM | DIASTOLIC BLOOD PRESSURE: 64 MMHG | HEART RATE: 73 BPM | SYSTOLIC BLOOD PRESSURE: 112 MMHG

## 2018-03-07 VITALS
DIASTOLIC BLOOD PRESSURE: 70 MMHG | TEMPERATURE: 95.9 F | HEART RATE: 86 BPM | OXYGEN SATURATION: 98 % | SYSTOLIC BLOOD PRESSURE: 118 MMHG | RESPIRATION RATE: 18 BRPM

## 2018-03-07 VITALS — BODY MASS INDEX: 24.39 KG/M2 | WEIGHT: 210.76 LBS | HEIGHT: 78 IN

## 2018-03-07 DIAGNOSIS — F17.210: ICD-10-CM

## 2018-03-07 DIAGNOSIS — S82.831E: ICD-10-CM

## 2018-03-07 DIAGNOSIS — S82.301E: Primary | ICD-10-CM

## 2018-03-07 DIAGNOSIS — S42.021D: ICD-10-CM

## 2018-03-07 PROCEDURE — 94150 VITAL CAPACITY TEST: CPT

## 2018-03-07 PROCEDURE — 73000 X-RAY EXAM OF COLLAR BONE: CPT

## 2018-03-07 PROCEDURE — 73590 X-RAY EXAM OF LOWER LEG: CPT

## 2018-03-07 PROCEDURE — 0PS904Z REPOSITION RIGHT CLAVICLE WITH INTERNAL FIXATION DEVICE, OPEN APPROACH: ICD-10-PCS | Performed by: ORTHOPAEDIC SURGERY

## 2018-03-07 PROCEDURE — 76000 FLUOROSCOPY <1 HR PHYS/QHP: CPT

## 2018-03-07 PROCEDURE — C1713 ANCHOR/SCREW BN/BN,TIS/BN: HCPCS

## 2018-03-07 PROCEDURE — 0QSJ04Z REPOSITION RIGHT FIBULA WITH INTERNAL FIXATION DEVICE, OPEN APPROACH: ICD-10-PCS | Performed by: ORTHOPAEDIC SURGERY

## 2018-03-07 PROCEDURE — 0QSG04Z REPOSITION RIGHT TIBIA WITH INTERNAL FIXATION DEVICE, OPEN APPROACH: ICD-10-PCS | Performed by: ORTHOPAEDIC SURGERY

## 2018-03-07 PROCEDURE — 0QPGX5Z REMOVAL OF EXTERNAL FIXATION DEVICE FROM RIGHT TIBIA, EXTERNAL APPROACH: ICD-10-PCS | Performed by: ORTHOPAEDIC SURGERY

## 2018-03-07 RX ADMIN — CALCIUM CARBONATE-CHOLECALCIFEROL TAB 250 MG-125 UNIT SCH MG: 250-125 TAB at 13:00

## 2018-03-07 RX ADMIN — ONDANSETRON PRN MG: 2 INJECTION, SOLUTION INTRAMUSCULAR; INTRAVENOUS at 18:09

## 2018-03-07 RX ADMIN — CLONIDINE HYDROCHLORIDE SCH MG: 0.1 INJECTION, SOLUTION EPIDURAL at 12:57

## 2018-03-07 RX ADMIN — CHLORHEXIDINE GLUCONATE PRN PACK: 500 CLOTH TOPICAL at 07:15

## 2018-03-07 RX ADMIN — CALCIUM CARBONATE-CHOLECALCIFEROL TAB 250 MG-125 UNIT SCH MG: 250-125 TAB at 18:09

## 2018-03-07 RX ADMIN — OXYTOCIN SCH MLS/HR: 10 INJECTION, SOLUTION INTRAMUSCULAR; INTRAVENOUS at 22:38

## 2018-03-07 RX ADMIN — OXYTOCIN SCH MLS/HR: 10 INJECTION, SOLUTION INTRAMUSCULAR; INTRAVENOUS at 12:00

## 2018-03-07 RX ADMIN — CLONIDINE HYDROCHLORIDE SCH MG: 0.1 INJECTION, SOLUTION EPIDURAL at 18:09

## 2018-03-07 RX ADMIN — CEFAZOLIN SODIUM SCH MLS/HR: 2 SOLUTION INTRAVENOUS at 18:08

## 2018-03-07 RX ADMIN — HYDROMORPHONE HCL-SODIUM CHLORIDE 0.9% INJ 6 MG/30ML SCH MG: 0.2 SOLUTION at 13:22

## 2018-03-07 RX ADMIN — VANCOMYCIN HYDROCHLORIDE SCH MLS/HR: 1 INJECTION, SOLUTION INTRAVENOUS at 22:38

## 2018-03-07 RX ADMIN — HYDROMORPHONE HYDROCHLORIDE PRN MG: 2 INJECTION INTRAMUSCULAR; INTRAVENOUS; SUBCUTANEOUS at 12:58

## 2018-03-07 RX ADMIN — HYDROMORPHONE HCL-SODIUM CHLORIDE 0.9% INJ 6 MG/30ML SCH MG: 0.2 SOLUTION at 20:45

## 2018-03-07 NOTE — RADRPT
EXAM DATE/TIME:  03/07/2018 09:59 

 

[facility] COMPARISON:     

No previous studies available for comparison.

 

                     

INDICATIONS :     

Right clavicle open reduction internal fixation.

                     

 

MEDICAL HISTORY :            

Non-responsive.   

 

SURGICAL HISTORY :        

Non-responsive.

 

ENCOUNTER:     

Initial                                        

 

ACUITY:     

1 day      

 

PAIN SCORE:     

Non-responsive.

 

LOCATION:     

Right  clavicle

 

 

CONCLUSION:     

Fluoroscopic images during placement of plate and screws along the right clavicle fixating fracture.

 

 

 

 Loi Calvillo MD on March 07, 2018 at 12:55           

Board Certified Radiologist.

 This report was verified electronically.
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
## 2018-03-07 NOTE — RADRPT
EXAM DATE/TIME:  03/07/2018 09:59 

 

[facility] COMPARISON:     

TIBIA/FIBULA RIGHT (AP/LAT), February 12, 2018, 19:18.

 

                     

INDICATIONS :     

Right tibia/fibula open reduction internal fixation.

                     

 

MEDICAL HISTORY :            

Unobtainable.   

 

SURGICAL HISTORY :        

Unobtainable.

 

ENCOUNTER:     

Subsequent                                        

 

ACUITY:     

1 day      

 

PAIN SCORE:     

Non-responsive.

 

LOCATION:     

Right  tibia/fibula

 

 

CONCLUSION:     

Fluoroscopic images during placement of plate and screws along the distal tibia and fibula fixating f
ractures

 

 

 

 Loi Calvillo MD on March 07, 2018 at 12:55           

Board Certified Radiologist.

 This report was verified electronically.

## 2018-03-08 VITALS
SYSTOLIC BLOOD PRESSURE: 114 MMHG | TEMPERATURE: 96.3 F | DIASTOLIC BLOOD PRESSURE: 66 MMHG | HEART RATE: 63 BPM | RESPIRATION RATE: 18 BRPM | OXYGEN SATURATION: 100 %

## 2018-03-08 VITALS
TEMPERATURE: 96.2 F | HEART RATE: 90 BPM | OXYGEN SATURATION: 97 % | RESPIRATION RATE: 18 BRPM | DIASTOLIC BLOOD PRESSURE: 70 MMHG | SYSTOLIC BLOOD PRESSURE: 129 MMHG

## 2018-03-08 VITALS
RESPIRATION RATE: 19 BRPM | TEMPERATURE: 97 F | OXYGEN SATURATION: 99 % | DIASTOLIC BLOOD PRESSURE: 70 MMHG | SYSTOLIC BLOOD PRESSURE: 119 MMHG | HEART RATE: 66 BPM

## 2018-03-08 VITALS — SYSTOLIC BLOOD PRESSURE: 110 MMHG | DIASTOLIC BLOOD PRESSURE: 64 MMHG

## 2018-03-08 VITALS
HEART RATE: 73 BPM | SYSTOLIC BLOOD PRESSURE: 107 MMHG | DIASTOLIC BLOOD PRESSURE: 55 MMHG | TEMPERATURE: 97 F | OXYGEN SATURATION: 97 % | RESPIRATION RATE: 20 BRPM

## 2018-03-08 VITALS
RESPIRATION RATE: 18 BRPM | SYSTOLIC BLOOD PRESSURE: 108 MMHG | OXYGEN SATURATION: 94 % | HEART RATE: 75 BPM | TEMPERATURE: 97.1 F | DIASTOLIC BLOOD PRESSURE: 55 MMHG

## 2018-03-08 VITALS
SYSTOLIC BLOOD PRESSURE: 99 MMHG | TEMPERATURE: 96.7 F | DIASTOLIC BLOOD PRESSURE: 53 MMHG | RESPIRATION RATE: 18 BRPM | HEART RATE: 73 BPM | OXYGEN SATURATION: 95 %

## 2018-03-08 RX ADMIN — HYDROMORPHONE HYDROCHLORIDE PRN MG: 2 INJECTION INTRAMUSCULAR; INTRAVENOUS; SUBCUTANEOUS at 21:29

## 2018-03-08 RX ADMIN — CEFAZOLIN SODIUM SCH MLS/HR: 2 SOLUTION INTRAVENOUS at 17:45

## 2018-03-08 RX ADMIN — CLONIDINE HYDROCHLORIDE SCH MG: 0.1 INJECTION, SOLUTION EPIDURAL at 00:41

## 2018-03-08 RX ADMIN — OXYTOCIN SCH MLS/HR: 10 INJECTION, SOLUTION INTRAMUSCULAR; INTRAVENOUS at 18:00

## 2018-03-08 RX ADMIN — CLONIDINE HYDROCHLORIDE SCH MG: 0.1 INJECTION, SOLUTION EPIDURAL at 07:42

## 2018-03-08 RX ADMIN — HYDROCODONE BITARTRATE AND ACETAMINOPHEN PRN TAB: 10; 325 TABLET ORAL at 08:16

## 2018-03-08 RX ADMIN — HYDROMORPHONE HCL-SODIUM CHLORIDE 0.9% INJ 6 MG/30ML SCH MG: 0.2 SOLUTION at 02:02

## 2018-03-08 RX ADMIN — VANCOMYCIN HYDROCHLORIDE SCH MLS/HR: 1 INJECTION, SOLUTION INTRAVENOUS at 23:29

## 2018-03-08 RX ADMIN — HYDROCODONE BITARTRATE AND ACETAMINOPHEN PRN TAB: 10; 325 TABLET ORAL at 00:41

## 2018-03-08 RX ADMIN — CEFAZOLIN SODIUM SCH MLS/HR: 2 SOLUTION INTRAVENOUS at 01:57

## 2018-03-08 RX ADMIN — HYDROCODONE BITARTRATE AND ACETAMINOPHEN PRN TAB: 10; 325 TABLET ORAL at 11:03

## 2018-03-08 RX ADMIN — ENOXAPARIN SODIUM SCH MG: 40 INJECTION SUBCUTANEOUS at 11:10

## 2018-03-08 RX ADMIN — OXYTOCIN SCH MLS/HR: 10 INJECTION, SOLUTION INTRAMUSCULAR; INTRAVENOUS at 08:20

## 2018-03-08 RX ADMIN — CEFAZOLIN SODIUM SCH MLS/HR: 2 SOLUTION INTRAVENOUS at 11:04

## 2018-03-08 RX ADMIN — CHLORHEXIDINE GLUCONATE PRN PACK: 500 CLOTH TOPICAL at 00:42

## 2018-03-08 RX ADMIN — HYDROCODONE BITARTRATE AND ACETAMINOPHEN PRN TAB: 10; 325 TABLET ORAL at 23:30

## 2018-03-08 RX ADMIN — HYDROCODONE BITARTRATE AND ACETAMINOPHEN PRN TAB: 10; 325 TABLET ORAL at 17:46

## 2018-03-08 RX ADMIN — CALCIUM CARBONATE-CHOLECALCIFEROL TAB 250 MG-125 UNIT SCH MG: 250-125 TAB at 08:15

## 2018-03-08 RX ADMIN — HYDROCODONE BITARTRATE AND ACETAMINOPHEN PRN TAB: 10; 325 TABLET ORAL at 14:38

## 2018-03-08 RX ADMIN — CALCIUM CARBONATE-CHOLECALCIFEROL TAB 250 MG-125 UNIT SCH MG: 250-125 TAB at 17:45

## 2018-03-08 RX ADMIN — CALCIUM CARBONATE-CHOLECALCIFEROL TAB 250 MG-125 UNIT SCH MG: 250-125 TAB at 14:33

## 2018-03-08 RX ADMIN — HYDROCODONE BITARTRATE AND ACETAMINOPHEN PRN TAB: 10; 325 TABLET ORAL at 20:34

## 2018-03-08 RX ADMIN — VANCOMYCIN HYDROCHLORIDE SCH MLS/HR: 1 INJECTION, SOLUTION INTRAVENOUS at 11:10

## 2018-03-08 NOTE — PD.ORT.PN
Subjective


Subjective Remarks


Pain controlled. Block to right tibia postoperatively





Objective


Vitals





Vital Signs








  Date Time  Temp Pulse Resp B/P (MAP) Pulse Ox O2 Delivery O2 Flow Rate FiO2


 


3/8/18 07:30 96.7 73 18 99/53 (68) 95   


 


3/8/18 04:00 97.0 73 20 107/55 (72) 97   


 


3/8/18 02:02   16     


 


3/8/18 00:00 97.1 75 18 108/55 (72) 94   


 


3/7/18 22:00   18     


 


3/7/18 20:45   17     


 


3/7/18 20:37 96.7 73 19 112/64 (80) 96   


 


3/7/18 16:00 95.9 86 18 118/70 (86) 98   


 


3/7/18 15:59  88 16 124/81 (95) 100 Nasal Cannula 2 


 


3/7/18 13:22   16     


 


3/7/18 13:00  74 16 120/80 (93) 100 Nasal Cannula 2 


 


3/7/18 12:45  61 16 108/67 (81) 98 Nasal Cannula 2 


 


3/7/18 12:30  59 16 112/65 (81) 98 Nasal Cannula 2 


 


3/7/18 12:20 96.8 76 16 113/69 (84) 100 Nasal Cannula 2 














I/O      


 


 3/7/18 3/7/18 3/7/18 3/8/18 3/8/18 3/8/18





 07:00 15:00 23:00 07:00 15:00 23:00


 


Intake Total  2000 ml 480 ml 600 ml  


 


Output Total  250 ml  870 ml  


 


Balance  1750 ml 480 ml -270 ml  


 


      


 


Intake Oral   480 ml 600 ml  


 


IV Total  2000 ml    


 


Output Urine Total    870 ml  


 


Estimated Blood Loss  250 ml    


 


# Voids   2   


 


# Bowel Movements   0 0  








Imaging





Last 72 hours Impressions








Tibia/Fibula X-Ray 3/7/18 0000 Signed





Impressions: 





 Service Date/Time:  Wednesday, March 7, 2018 09:59 - CONCLUSION:  Fluoroscopic 





 images during placement of plate and screws along the distal tibia and fibula 





 fixating fractures     Loi Calvillo MD 


 


Clavicle X-Ray 3/7/18 0000 Signed





Impressions: 





 Service Date/Time:  Wednesday, March 7, 2018 09:59 - CONCLUSION:  Fluoroscopic 





 images during placement of plate and screws along the right clavicle fixating 





 fracture.     Loi Calvillo MD 








Objective Remarks


Right upper extremity. Clean dry dressings intact over clavicle. Intact 

sensation of the radial ulnar median nerve distributions. He is able fully 

extend and flex all his fingers right hand.


Right lower extremity: Clean dry splint in place. Intact sensation distally in 

toes. Is able to move all his toes appropriately





Assessment & Plan


Assessment and Plan


Right clavicle fracture ORIF POD 1


   Nonweightbearing right upper extremity


   Dry dressings with change POD 2


Right distal tibia and fibula fractures with removal external fixation with 

open reduction internal fixation POD 1


   Maintain splint


   Elevation


   Nonweightbearing right lower extremity


Plan on DC of pain pump at 1 PM today


Plan for discharge to home tomorrow once pain is controlled


Follow-up appointment Dr. Bains or PA in 2 weeks


Kristopher Moss Jr. Mar 8, 2018 07:52

## 2018-03-09 VITALS
OXYGEN SATURATION: 96 % | TEMPERATURE: 95.8 F | DIASTOLIC BLOOD PRESSURE: 70 MMHG | SYSTOLIC BLOOD PRESSURE: 112 MMHG | HEART RATE: 79 BPM | RESPIRATION RATE: 18 BRPM

## 2018-03-09 VITALS
SYSTOLIC BLOOD PRESSURE: 127 MMHG | TEMPERATURE: 96.8 F | HEART RATE: 80 BPM | DIASTOLIC BLOOD PRESSURE: 70 MMHG | RESPIRATION RATE: 17 BRPM | OXYGEN SATURATION: 100 %

## 2018-03-09 VITALS
TEMPERATURE: 96.8 F | RESPIRATION RATE: 17 BRPM | OXYGEN SATURATION: 95 % | SYSTOLIC BLOOD PRESSURE: 103 MMHG | DIASTOLIC BLOOD PRESSURE: 69 MMHG | HEART RATE: 78 BPM

## 2018-03-09 VITALS
TEMPERATURE: 97.1 F | SYSTOLIC BLOOD PRESSURE: 116 MMHG | OXYGEN SATURATION: 94 % | DIASTOLIC BLOOD PRESSURE: 63 MMHG | RESPIRATION RATE: 18 BRPM | HEART RATE: 82 BPM

## 2018-03-09 VITALS — SYSTOLIC BLOOD PRESSURE: 117 MMHG | DIASTOLIC BLOOD PRESSURE: 70 MMHG | HEART RATE: 67 BPM

## 2018-03-09 RX ADMIN — VANCOMYCIN HYDROCHLORIDE SCH MLS/HR: 1 INJECTION, SOLUTION INTRAVENOUS at 11:12

## 2018-03-09 RX ADMIN — HYDROCODONE BITARTRATE AND ACETAMINOPHEN PRN TAB: 10; 325 TABLET ORAL at 13:28

## 2018-03-09 RX ADMIN — CALCIUM CARBONATE-CHOLECALCIFEROL TAB 250 MG-125 UNIT SCH MG: 250-125 TAB at 13:27

## 2018-03-09 RX ADMIN — CEFAZOLIN SODIUM SCH MLS/HR: 2 SOLUTION INTRAVENOUS at 02:01

## 2018-03-09 RX ADMIN — OXYTOCIN SCH MLS/HR: 10 INJECTION, SOLUTION INTRAMUSCULAR; INTRAVENOUS at 04:00

## 2018-03-09 RX ADMIN — ENOXAPARIN SODIUM SCH MG: 40 INJECTION SUBCUTANEOUS at 11:12

## 2018-03-09 RX ADMIN — CEFAZOLIN SODIUM SCH MLS/HR: 2 SOLUTION INTRAVENOUS at 09:31

## 2018-03-09 RX ADMIN — HYDROCODONE BITARTRATE AND ACETAMINOPHEN PRN TAB: 10; 325 TABLET ORAL at 09:28

## 2018-03-09 RX ADMIN — ONDANSETRON PRN MG: 2 INJECTION, SOLUTION INTRAMUSCULAR; INTRAVENOUS at 09:56

## 2018-03-09 RX ADMIN — CALCIUM CARBONATE-CHOLECALCIFEROL TAB 250 MG-125 UNIT SCH MG: 250-125 TAB at 09:27

## 2018-03-09 RX ADMIN — HYDROMORPHONE HCL-SODIUM CHLORIDE 0.9% INJ 6 MG/30ML SCH MG: 0.2 SOLUTION at 07:01

## 2018-03-09 RX ADMIN — HYDROMORPHONE HCL-SODIUM CHLORIDE 0.9% INJ 6 MG/30ML SCH MG: 0.2 SOLUTION at 01:50

## 2018-03-09 NOTE — PD.ORT.PN
Subjective


Subjective Remarks


Had increased pain overnight and pain pump was reinstated. States the pain has 

continued to improve. He did remove stirrup portion of his short leg splint. He 

has no new complaints





Objective


Vitals





Vital Signs








  Date Time  Temp Pulse Resp B/P (MAP) Pulse Ox O2 Delivery O2 Flow Rate FiO2


 


3/9/18 04:10 96.8 78 17 103/69 (80) 95   


 


3/9/18 01:58  67  117/70 (86)    


 


3/9/18 01:50   17     


 


3/9/18 00:10 96.8 80 17 127/70 (89) 100   


 


3/8/18 20:10 97.0 66 19 119/70 (86) 99   


 


3/8/18 15:57 96.2 90 18 129/70 (89) 97   


 


3/8/18 11:38 96.3 63 18 114/66 (82) 100   














I/O      


 


 3/8/18 3/8/18 3/8/18 3/9/18 3/9/18 3/9/18





 07:00 15:00 23:00 07:00 15:00 23:00


 


Intake Total 600 ml 300 ml 2480 ml 480 ml  


 


Output Total 870 ml     


 


Balance -270 ml 300 ml 2480 ml 480 ml  


 


      


 


Intake Oral 600 ml  480 ml 480 ml  


 


IV Total  300 ml 2000 ml   


 


Output Urine Total 870 ml     


 


# Voids   2 1  


 


# Bowel Movements 0  0 0  








Imaging





Last 72 hours Impressions








Tibia/Fibula X-Ray 3/7/18 0000 Signed





Impressions: 





 Service Date/Time:  Wednesday, March 7, 2018 09:59 - CONCLUSION:  Fluoroscopic 





 images during placement of plate and screws along the distal tibia and fibula 





 fixating fractures     Loi Calvillo MD 


 


Clavicle X-Ray 3/7/18 0000 Signed





Impressions: 





 Service Date/Time:  Wednesday, March 7, 2018 09:59 - CONCLUSION:  Fluoroscopic 





 images during placement of plate and screws along the right clavicle fixating 





 fracture.     Loi Calvillo MD 








Objective Remarks


Right upper extremity. Clean dry dressings intact over clavicle. Intact 

sensation of the radial ulnar median nerve distributions. He is able fully 

extend and flex all his fingers right hand.


Right lower extremity: Clean dry splint in place. Stirrup splint is sitting 

beside his leg. Ace wraps off and removed. Intact sensation distally in toes. 

Is able to move all his toes appropriately





Assessment & Plan


Assessment and Plan


Right clavicle fracture ORIF POD 2


   Nonweightbearing right upper extremity


   Dry dressings 


Right distal tibia and fibula fractures with removal external fixation with 

open reduction internal fixation POD 1


   Maintain splint - [employer] to apply a new stirrup splint to ankle


   Elevation


   Nonweightbearing right lower extremity


Plan on DC of pain pump at  1000 is morning


Plan for discharge to home today once pain is controlled


Follow-up appointment Dr. Bains or PA in 2 weeks


Kristopher Moss Jr. Mar 9, 2018 08:10

## 2018-04-08 ENCOUNTER — HOSPITAL ENCOUNTER (EMERGENCY)
Dept: HOSPITAL 17 - PHED | Age: 43
Discharge: HOME | End: 2018-04-08
Payer: COMMERCIAL

## 2018-04-08 VITALS — HEIGHT: 78 IN | BODY MASS INDEX: 25.56 KG/M2 | WEIGHT: 220.9 LBS

## 2018-04-08 VITALS
RESPIRATION RATE: 16 BRPM | OXYGEN SATURATION: 98 % | DIASTOLIC BLOOD PRESSURE: 79 MMHG | SYSTOLIC BLOOD PRESSURE: 123 MMHG | HEART RATE: 69 BPM

## 2018-04-08 VITALS
SYSTOLIC BLOOD PRESSURE: 140 MMHG | RESPIRATION RATE: 18 BRPM | HEART RATE: 96 BPM | DIASTOLIC BLOOD PRESSURE: 86 MMHG | TEMPERATURE: 98.1 F | OXYGEN SATURATION: 100 %

## 2018-04-08 DIAGNOSIS — S82.301D: ICD-10-CM

## 2018-04-08 DIAGNOSIS — T81.30XA: Primary | ICD-10-CM

## 2018-04-08 DIAGNOSIS — B95.61: ICD-10-CM
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
## 2018-04-08 DIAGNOSIS — M19.049: ICD-10-CM

## 2018-04-08 DIAGNOSIS — Z79.899: ICD-10-CM

## 2018-04-08 PROCEDURE — 87186 SC STD MICRODIL/AGAR DIL: CPT

## 2018-04-08 PROCEDURE — 87070 CULTURE OTHR SPECIMN AEROBIC: CPT

## 2018-04-08 PROCEDURE — 86403 PARTICLE AGGLUT ANTBDY SCRN: CPT

## 2018-04-08 PROCEDURE — 73610 X-RAY EXAM OF ANKLE: CPT

## 2018-04-08 PROCEDURE — 99284 EMERGENCY DEPT VISIT MOD MDM: CPT

## 2018-04-08 PROCEDURE — 87205 SMEAR GRAM STAIN: CPT

## 2018-04-08 NOTE — RADRPT
EXAM DATE/TIME:  04/08/2018 04:18 

 

[facility] COMPARISON:     

TIBIA/FIBULA RIGHT (AP/LAT), March 07, 2018, 9:59.

 

                     

INDICATIONS :     

Swelling, and open wound.

                     

 

MEDICAL HISTORY :     

None.          

 

SURGICAL HISTORY :        

Right clavicle ORIF. Right Tibia/fibula ORIF. Ex fix right ankle.

 

ENCOUNTER:     

Initial                                        

 

ACUITY:     

1 week      

 

PAIN SCORE:     

0/10

 

LOCATION:     

Right  ankle

 

FINDINGS:     

3 views of the right ankle. Lateral distal tibial internal fixation plate and multiple transfixing sc
rews and lateral distal fibula internal fixation plate and multiple transfixing screws. Comminuted fr
acture of the distal tibia again seen. Distal fibular shaft fracture again seen. Alignment is grossly
 unchanged. No advanced bony bridging seen at the fracture sites. No new fractures identified.

 

CONCLUSION:     

Distal tibia and fibular fractures again seen status post ORIF. No evidence of bone bridging at the f
racture sites. No new fractures identified. No focal bone erosion identified.

 

 

 

 Santi Jean MD on April 08, 2018 at 4:45           

Board Certified Radiologist.

 This report was verified electronically.
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
## 2018-04-24 ENCOUNTER — HOSPITAL ENCOUNTER (INPATIENT)
Dept: HOSPITAL 17 - NEPC | Age: 43
LOS: 10 days | Discharge: HOME | DRG: 493 | End: 2018-05-04
Attending: HOSPITALIST | Admitting: HOSPITALIST
Payer: COMMERCIAL

## 2018-04-24 VITALS — BODY MASS INDEX: 26.78 KG/M2 | HEIGHT: 78 IN | WEIGHT: 231.49 LBS

## 2018-04-24 VITALS
OXYGEN SATURATION: 99 % | DIASTOLIC BLOOD PRESSURE: 64 MMHG | SYSTOLIC BLOOD PRESSURE: 118 MMHG | HEART RATE: 99 BPM | RESPIRATION RATE: 18 BRPM

## 2018-04-24 VITALS
RESPIRATION RATE: 16 BRPM | SYSTOLIC BLOOD PRESSURE: 120 MMHG | HEART RATE: 112 BPM | OXYGEN SATURATION: 99 % | DIASTOLIC BLOOD PRESSURE: 65 MMHG | TEMPERATURE: 98.5 F

## 2018-04-24 DIAGNOSIS — F43.23: ICD-10-CM

## 2018-04-24 DIAGNOSIS — F17.200: ICD-10-CM

## 2018-04-24 DIAGNOSIS — S82.401G: ICD-10-CM

## 2018-04-24 DIAGNOSIS — D64.9: ICD-10-CM

## 2018-04-24 DIAGNOSIS — B95.61: ICD-10-CM

## 2018-04-24 DIAGNOSIS — T84.624A: Primary | ICD-10-CM

## 2018-04-24 DIAGNOSIS — T84.622A: ICD-10-CM

## 2018-04-24 DIAGNOSIS — T81.32XA: ICD-10-CM

## 2018-04-24 DIAGNOSIS — S82.201G: ICD-10-CM

## 2018-04-24 LAB
BASOPHILS # BLD AUTO: 0.1 TH/MM3 (ref 0–0.2)
BASOPHILS NFR BLD: 1.3 % (ref 0–2)
BUN SERPL-MCNC: 11 MG/DL (ref 7–18)
CALCIUM SERPL-MCNC: 9.5 MG/DL (ref 8.5–10.1)
CHLORIDE SERPL-SCNC: 106 MEQ/L (ref 98–107)
CREAT SERPL-MCNC: 1.07 MG/DL (ref 0.6–1.3)
EOSINOPHIL # BLD: 0.3 TH/MM3 (ref 0–0.4)
EOSINOPHIL NFR BLD: 4.6 % (ref 0–4)
ERYTHROCYTE [DISTWIDTH] IN BLOOD BY AUTOMATED COUNT: 14.3 % (ref 11.6–17.2)
GFR SERPLBLD BASED ON 1.73 SQ M-ARVRAT: 75 ML/MIN (ref 89–?)
GLUCOSE SERPL-MCNC: 90 MG/DL (ref 74–106)
HCO3 BLD-SCNC: 27.2 MEQ/L (ref 21–32)
HCT VFR BLD CALC: 41 % (ref 39–51)
HGB BLD-MCNC: 13.8 GM/DL (ref 13–17)
INR PPP: 1 RATIO
LYMPHOCYTES # BLD AUTO: 2.1 TH/MM3 (ref 1–4.8)
LYMPHOCYTES NFR BLD AUTO: 30.1 % (ref 9–44)
MCH RBC QN AUTO: 30.1 PG (ref 27–34)
MCHC RBC AUTO-ENTMCNC: 33.8 % (ref 32–36)
MCV RBC AUTO: 89.3 FL (ref 80–100)
MONOCYTE #: 1 TH/MM3 (ref 0–0.9)
MONOCYTES NFR BLD: 14.2 % (ref 0–8)
NEUTROPHILS # BLD AUTO: 3.5 TH/MM3 (ref 1.8–7.7)
NEUTROPHILS NFR BLD AUTO: 49.8 % (ref 16–70)
PLATELET # BLD: 287 TH/MM3 (ref 150–450)
PMV BLD AUTO: 8.4 FL (ref 7–11)
PROTHROMBIN TIME: 9.7 SEC (ref 9.8–11.6)
RBC # BLD AUTO: 4.59 MIL/MM3 (ref 4.5–5.9)
SODIUM SERPL-SCNC: 139 MEQ/L (ref 136–145)
WBC # BLD AUTO: 7 TH/MM3 (ref 4–11)

## 2018-04-24 PROCEDURE — 87186 SC STD MICRODIL/AGAR DIL: CPT

## 2018-04-24 PROCEDURE — 85025 COMPLETE CBC W/AUTO DIFF WBC: CPT

## 2018-04-24 PROCEDURE — 87015 SPECIMEN INFECT AGNT CONCNTJ: CPT

## 2018-04-24 PROCEDURE — 87176 TISSUE HOMOGENIZATION CULTR: CPT

## 2018-04-24 PROCEDURE — 87102 FUNGUS ISOLATION CULTURE: CPT

## 2018-04-24 PROCEDURE — 87147 CULTURE TYPE IMMUNOLOGIC: CPT

## 2018-04-24 PROCEDURE — 87070 CULTURE OTHR SPECIMN AEROBIC: CPT

## 2018-04-24 PROCEDURE — 80048 BASIC METABOLIC PNL TOTAL CA: CPT

## 2018-04-24 PROCEDURE — 87206 SMEAR FLUORESCENT/ACID STAI: CPT

## 2018-04-24 PROCEDURE — 87040 BLOOD CULTURE FOR BACTERIA: CPT

## 2018-04-24 PROCEDURE — 83605 ASSAY OF LACTIC ACID: CPT

## 2018-04-24 PROCEDURE — 87116 MYCOBACTERIA CULTURE: CPT

## 2018-04-24 PROCEDURE — 86403 PARTICLE AGGLUT ANTBDY SCRN: CPT

## 2018-04-24 PROCEDURE — 85610 PROTHROMBIN TIME: CPT

## 2018-04-24 PROCEDURE — 85730 THROMBOPLASTIN TIME PARTIAL: CPT

## 2018-04-24 PROCEDURE — 87205 SMEAR GRAM STAIN: CPT

## 2018-04-24 PROCEDURE — 85027 COMPLETE CBC AUTOMATED: CPT

## 2018-04-24 PROCEDURE — 94150 VITAL CAPACITY TEST: CPT

## 2018-04-24 NOTE — PD
HPI


Chief Complaint:  Pain: Acute or Chronic


Time Seen by Provider:  20:42


Travel History


International Travel<30 days:  No


Contact w/Intl Traveler<30days:  No


Traveled to known affect area:  No





History of Present Illness


HPI


42yo M with PSH of ORIF right distal tibia and fibula by Dr. Bains 3/9/18 said 

he was sent here from Dr. Bains's office today for admission and antibiotics.  

Pt has a open wound in right distal fibula that seems deep and a superficial 

wound on medial aspect.  Denies any fever, chest pain, sob, n/v, abdominal pain

, focal weakness or numbness.  Denies any new trauma.





PFSH


Past Medical History


Arthritis:  Yes (HAND)


Cancer:  No


Cardiovascular Problems:  No


Diabetes:  No


Endocrine:  No


Genitourinary:  No


Hepatitis:  No


Hiatal Hernia:  No


Immune Disorder:  No


Implanted Vascular Access Dvce:  No


Musculoskeletal:  Yes (RIGHT LEG FRACTURE, RIGHT CLAVICLE FRACTURE)


Neurologic:  No


Psychiatric:  No


Reproductive:  No


Respiratory:  No


Thyroid Disease:  No


Tetanus Vaccination:  Unknown


Influenza Vaccination:  No





Past Surgical History


AICD:  No


Cardiac Surgery:  No


Ear Surgery:  No


Endocrine Surgery:  No


Eye Surgery:  No


Genitourinary Surgery:  No


Joint Replacement:  No


Oral Surgery:  No


Pacemaker:  No


Thoracic Surgery:  No





Social History


Alcohol Use:  Yes (occasionally)


Tobacco Use:  Yes


Substance Use:  No





Allergies-Medications


(Allergen,Severity, Reaction):  


Coded Allergies:  


     No Known Allergies (Verified  Allergy, Mild, 4/24/18)


Reported Meds & Prescriptions





Reported Meds & Active Scripts


Active


No Active Prescriptions or Reported Medications    








Review of Systems


Except as stated in HPI:  all other systems reviewed are Neg





Physical Exam


Narrative


GENERAL: 42yo M not in distress.


SKIN: Focused skin assessment warm/dry.


HEAD: Atraumatic. Normocephalic. 


EYES: Pupils equal and round. No scleral icterus. No injection or drainage. 


ENT: No nasal bleeding or discharge.  Mucous membranes pink and moist.


NECK: Trachea midline. No JVD. 


CARDIOVASCULAR: Regular rate and rhythm.  No murmur appreciated.


RESPIRATORY: No accessory muscle use. Clear to auscultation. Breath sounds 

equal bilaterally. 


GASTROINTESTINAL: Abdomen soft, non-tender, nondistended. 


MUSCULOSKELETAL: RLE: +6cm by 3cm open wound lateral malleolus that may be 

tracking to the bone.  No purulent discharge.  Medial malleolus: +6cm by 3cm 

open wound that is superficial with no purulent discharge.


NEUROLOGICAL: Awake and alert. No obvious cranial nerve deficits.  Motor 

grossly within normal limits. Normal speech.


PSYCHIATRIC: Appropriate mood and affect; insight and judgment normal.





Data


Data


Last Documented VS





Vital Signs








  Date Time  Temp Pulse Resp B/P (MAP) Pulse Ox O2 Delivery O2 Flow Rate FiO2


 


4/24/18 20:50  99 18 118/64 (82) 99 Room Air  


 


4/24/18 20:24 98.5       








Orders





 Orders


Complete Blood Count With Diff (4/24/18 20:53)


Basic Metabolic Panel (Bmp) (4/24/18 20:53)


Blood Culture (4/24/18 20:53)


Lactic Acid Sepsis Protocol (4/24/18 20:53)


Prothrombin Time / Inr (Pt) (4/24/18 20:53)


Act Partial Throm Time (Ptt) (4/24/18 20:53)


Wound Culture And Gram Stain (4/24/18 21:02)


Npo After Midnight W/ Po Meds (4/25/18 Breakfast)


Oxycodone-Acetamin 5-325 Mg (Percocet (4/24/18 22:00)


Admit Order (Ed Use Only) (4/24/18 22:08)





Labs





Laboratory Tests








Test


  4/24/18


21:10


 


White Blood Count 7.0 TH/MM3 


 


Red Blood Count 4.59 MIL/MM3 


 


Hemoglobin 13.8 GM/DL 


 


Hematocrit 41.0 % 


 


Mean Corpuscular Volume 89.3 FL 


 


Mean Corpuscular Hemoglobin 30.1 PG 


 


Mean Corpuscular Hemoglobin


Concent 33.8 % 


 


 


Red Cell Distribution Width 14.3 % 


 


Platelet Count 287 TH/MM3 


 


Mean Platelet Volume 8.4 FL 


 


Neutrophils (%) (Auto) 49.8 % 


 


Lymphocytes (%) (Auto) 30.1 % 


 


Monocytes (%) (Auto) 14.2 % 


 


Eosinophils (%) (Auto) 4.6 % 


 


Basophils (%) (Auto) 1.3 % 


 


Neutrophils # (Auto) 3.5 TH/MM3 


 


Lymphocytes # (Auto) 2.1 TH/MM3 


 


Monocytes # (Auto) 1.0 TH/MM3 


 


Eosinophils # (Auto) 0.3 TH/MM3 


 


Basophils # (Auto) 0.1 TH/MM3 


 


CBC Comment DIFF FINAL 


 


Differential Comment  


 


Prothrombin Time 9.7 SEC 


 


Prothromb Time International


Ratio 1.0 RATIO 


 


 


Activated Partial


Thromboplast Time 27.0 SEC 


 


 


Blood Urea Nitrogen 11 MG/DL 


 


Creatinine 1.07 MG/DL 


 


Random Glucose 90 MG/DL 


 


Calcium Level 9.5 MG/DL 


 


Sodium Level 139 MEQ/L 


 


Potassium Level 3.8 MEQ/L 


 


Chloride Level 106 MEQ/L 


 


Carbon Dioxide Level 27.2 MEQ/L 


 


Anion Gap 6 MEQ/L 


 


Estimat Glomerular Filtration


Rate 75 ML/MIN 


 


 


Lactic Acid Level 1.3 mmol/L 











MDM


Medical Decision Making


Medical Screen Exam Complete:  Yes


Emergency Medical Condition:  Yes


Differential Diagnosis


Osteomyelitis vs. nonhealing wound


Narrative Course


42yo M was sent here by Dr. Bains's office for admission.  I call Dr. Bains 

and discussed with his PA and he would like pt to be admitted to medicine, NPO 

after midnight, wound culture and they will do wash out in the OR tomorrow.  He 

said no antibiotics at this time.  Labs reviewed, no leukocytosis.  H/H normal.

  Lactic acid normal at 1.3.  Discussed with Dr. Machado and accepted to her 

service.





Diagnosis





 Primary Impression:  


 Wound of right ankle


 Qualified Codes:  S91.001D - Unspecified open wound, right ankle, subsequent 

encounter





Admitting Information


Admitting Physician Requests:  Observation


Scripts


No Active Prescriptions or Reported Meds











Maria Del Carmen Garg DO Apr 24, 2018 21:02

## 2018-04-25 VITALS
HEART RATE: 78 BPM | SYSTOLIC BLOOD PRESSURE: 130 MMHG | OXYGEN SATURATION: 99 % | RESPIRATION RATE: 16 BRPM | DIASTOLIC BLOOD PRESSURE: 73 MMHG | TEMPERATURE: 98.2 F

## 2018-04-25 VITALS
RESPIRATION RATE: 18 BRPM | OXYGEN SATURATION: 98 % | SYSTOLIC BLOOD PRESSURE: 132 MMHG | DIASTOLIC BLOOD PRESSURE: 76 MMHG | TEMPERATURE: 98.2 F | HEART RATE: 75 BPM

## 2018-04-25 VITALS
SYSTOLIC BLOOD PRESSURE: 142 MMHG | HEART RATE: 71 BPM | RESPIRATION RATE: 16 BRPM | TEMPERATURE: 98.3 F | DIASTOLIC BLOOD PRESSURE: 84 MMHG | OXYGEN SATURATION: 98 %

## 2018-04-25 VITALS
RESPIRATION RATE: 18 BRPM | SYSTOLIC BLOOD PRESSURE: 146 MMHG | TEMPERATURE: 97.7 F | HEART RATE: 75 BPM | DIASTOLIC BLOOD PRESSURE: 76 MMHG | OXYGEN SATURATION: 98 %

## 2018-04-25 VITALS
TEMPERATURE: 97.8 F | HEART RATE: 75 BPM | SYSTOLIC BLOOD PRESSURE: 141 MMHG | DIASTOLIC BLOOD PRESSURE: 77 MMHG | RESPIRATION RATE: 18 BRPM | OXYGEN SATURATION: 98 %

## 2018-04-25 VITALS
TEMPERATURE: 99 F | DIASTOLIC BLOOD PRESSURE: 98 MMHG | OXYGEN SATURATION: 99 % | HEART RATE: 85 BPM | RESPIRATION RATE: 18 BRPM | SYSTOLIC BLOOD PRESSURE: 132 MMHG

## 2018-04-25 LAB
BASOPHILS # BLD AUTO: 0.1 TH/MM3 (ref 0–0.2)
BASOPHILS NFR BLD: 1.1 % (ref 0–2)
BUN SERPL-MCNC: 10 MG/DL (ref 7–18)
CALCIUM SERPL-MCNC: 8.6 MG/DL (ref 8.5–10.1)
CHLORIDE SERPL-SCNC: 109 MEQ/L (ref 98–107)
CREAT SERPL-MCNC: 0.89 MG/DL (ref 0.6–1.3)
EOSINOPHIL # BLD: 0.3 TH/MM3 (ref 0–0.4)
EOSINOPHIL NFR BLD: 3.2 % (ref 0–4)
ERYTHROCYTE [DISTWIDTH] IN BLOOD BY AUTOMATED COUNT: 14.7 % (ref 11.6–17.2)
GFR SERPLBLD BASED ON 1.73 SQ M-ARVRAT: 93 ML/MIN (ref 89–?)
GLUCOSE SERPL-MCNC: 94 MG/DL (ref 74–106)
HCO3 BLD-SCNC: 25.8 MEQ/L (ref 21–32)
HCT VFR BLD CALC: 34.9 % (ref 39–51)
HGB BLD-MCNC: 11.8 GM/DL (ref 13–17)
LYMPHOCYTES # BLD AUTO: 2 TH/MM3 (ref 1–4.8)
LYMPHOCYTES NFR BLD AUTO: 24.3 % (ref 9–44)
MCH RBC QN AUTO: 30 PG (ref 27–34)
MCHC RBC AUTO-ENTMCNC: 33.9 % (ref 32–36)
MCV RBC AUTO: 88.5 FL (ref 80–100)
MONOCYTE #: 1.4 TH/MM3 (ref 0–0.9)
MONOCYTES NFR BLD: 16.7 % (ref 0–8)
NEUTROPHILS # BLD AUTO: 4.6 TH/MM3 (ref 1.8–7.7)
NEUTROPHILS NFR BLD AUTO: 54.7 % (ref 16–70)
PLATELET # BLD: 249 TH/MM3 (ref 150–450)
PMV BLD AUTO: 8.4 FL (ref 7–11)
RBC # BLD AUTO: 3.94 MIL/MM3 (ref 4.5–5.9)
SODIUM SERPL-SCNC: 141 MEQ/L (ref 136–145)
WBC # BLD AUTO: 8.3 TH/MM3 (ref 4–11)

## 2018-04-25 RX ADMIN — Medication SCH ML: at 08:13

## 2018-04-25 RX ADMIN — OXYCODONE HYDROCHLORIDE AND ACETAMINOPHEN PRN TAB: 10; 325 TABLET ORAL at 21:09

## 2018-04-25 RX ADMIN — PHENYTOIN SODIUM SCH MLS/HR: 50 INJECTION INTRAMUSCULAR; INTRAVENOUS at 08:13

## 2018-04-25 RX ADMIN — OXYCODONE HYDROCHLORIDE AND ACETAMINOPHEN PRN TAB: 10; 325 TABLET ORAL at 08:05

## 2018-04-25 RX ADMIN — PHENYTOIN SODIUM SCH MLS/HR: 50 INJECTION INTRAMUSCULAR; INTRAVENOUS at 21:08

## 2018-04-25 RX ADMIN — PHENYTOIN SODIUM SCH MLS/HR: 50 INJECTION INTRAMUSCULAR; INTRAVENOUS at 03:25

## 2018-04-25 RX ADMIN — STANDARDIZED SENNA CONCENTRATE AND DOCUSATE SODIUM SCH TAB: 8.6; 5 TABLET, FILM COATED ORAL at 21:00

## 2018-04-25 RX ADMIN — OXYCODONE HYDROCHLORIDE AND ACETAMINOPHEN PRN TAB: 10; 325 TABLET ORAL at 13:35

## 2018-04-25 RX ADMIN — CLINDAMYCIN PHOSPHATE SCH MLS/HR: 150 INJECTION, SOLUTION INTRAMUSCULAR; INTRAVENOUS at 16:47

## 2018-04-25 RX ADMIN — LACTOBACILLUS ACIDOPHILUS / LACTOBACILLUS BULGARICUS SCH GM: 100 MILLION CFU STRENGTH GRANULES at 17:25

## 2018-04-25 RX ADMIN — Medication SCH ML: at 21:00

## 2018-04-25 RX ADMIN — OXYCODONE HYDROCHLORIDE AND ACETAMINOPHEN PRN TAB: 10; 325 TABLET ORAL at 03:24

## 2018-04-25 NOTE — HHI.PR
Addendum to Inpatient Note


Addendum Reason:  Additional Documentation


Additional Information


The patient continued to complain of pain.  He said he heard he was having 

surgery tomorrow.  He said he has been in pain for months.  Wound culture is 

growing staph aureus.  Start IV clindamycin along with lactobacillus.  

Increased pain control.  Follow-up with orthopedic surgery for washout in the 

morning.  Also, make the patient inpatient.











Kristopher Hamilton DO Apr 25, 2018 14:19

## 2018-04-25 NOTE — PD.ORT.PN
Subjective


Subjective Remarks


Patient know to Dr Hirsch for ORIF of right distal tibfib and right clavicle


patient was seen in office yesterday an found ot have large wound on lateral 

right ankle.  patient has been extremely non-compliant and reports cutting off 

own cast and walking on ankle.





Objective


Vitals





Vital Signs








  Date Time  Temp Pulse Resp B/P (MAP) Pulse Ox O2 Delivery O2 Flow Rate FiO2


 


4/25/18 07:32 97.7 75 18 146/76 (99) 98   


 


4/25/18 03:38 98.2 78 16 130/73 (92) 99   


 


4/25/18 01:00 98.3 71 16 142/84 (103) 98   


 


4/24/18 20:50  99 18 118/64 (82) 99 Room Air  


 


4/24/18 20:24 98.5 112 16 120/65 (83) 99   








Result Diagram:  


4/25/18 0413                                                                   

             4/25/18 0413





Other Results





Laboratory Tests








Test


  4/24/18


21:10


 


Prothromb Time International


Ratio 1.0 RATIO 


 


 


Prothrombin Time


  9.7 SEC


(9.8-11.6)








Objective Remarks


RLE: dehissence and large wound of lateral ankle approx [id number] with purulent 

drainage.





Assessment & Plan


Assessment and Plan


1) Right Distal Tibia/Fibula Fx s/p ORIF with wound dehiscence and infection


   -NWB


   -maintain dressings


   -normal diet


   -npo after MN


   -plan for OR tomorrow with Joycelyn for I&D and possible vac placement











Jalil Jacobs/First Assist PA Apr 25, 2018 08:57

## 2018-04-25 NOTE — HHI.HP
__________________________________________________





\[facility] [place] [facility]\""


Service


[facility]


Primary Care Physician


No Primary Care Physician


Admission Diagnosis





Right ankle wound


Diagnoses:  


Travel History


International Travel<30 Days:  No


Contact w/Intl Traveler <30 Da:  No


Traveled to Known Affected Are:  No


History of Present Illness


43-year-old male status post ORIF of the right tibia/fibula on 3/7/18 with Dr. Hirsch complicated by wound dehiscence presents to the emergency department 

from Dr. Hirsch's office today for further treatment of his wound dehiscence.  

The patient reports severe pain in his right lateral ankle.  He denies any 

chest pain or shortness of breath.  No abdominal pain.  No nausea/vomiting/

diarrhea.  No fatigue/weakness.  No fevers/chills.





Review of Systems


Except as stated in HPI:  all other systems reviewed are Neg





Past Family Social History


Past Medical History


None


Past Surgical History


ORIF right tibia/fibula


Right clavicle repair


Reported Medications





Reported Meds & Active Scripts


Active


No Active Prescriptions or Reported Medications


Allergies:  


Coded Allergies:  


     No Known Allergies (Verified  Allergy, Mild, 18)


Family History


Negative for CAD/DM


Social History


Smokes approximately 1 pack per day.  Rare alcohol.  Denies illicit drugs.





Physical Exam


Vital Signs





Vital Signs








  Date Time  Temp Pulse Resp B/P (MAP) Pulse Ox O2 Delivery O2 Flow Rate FiO2


 


18 20:50  99 18 118/64 (82) 99 Room Air  


 


18 20:24 98.5 112 16 120/65 (83) 99   








Physical Exam


GENERAL:  male lying in bed


SKIN: Deep wound over the lateral aspect of the right ankle


HEAD: Atraumatic. Normocephalic. No temporal or scalp tenderness.


EYES: Pupils equal round and reactive. Extraocular motions intact. No scleral 

icterus. No injection or drainage. 


ENT: Nose without bleeding, purulent drainage or septal hematoma. Throat 

without erythema, tonsillar hypertrophy or exudate. Uvula midline. Airway 

patent.


NECK: Trachea midline. No JVD or lymphadenopathy. Supple, nontender, no 

meningeal signs.


CARDIOVASCULAR: Regular rate and rhythm without murmurs, gallops, or rubs. 


RESPIRATORY: Clear to auscultation. Breath sounds equal bilaterally. No wheezes

, rales, or rhonchi.  


GASTROINTESTINAL: Abdomen soft, non-tender, nondistended. No hepato-splenomegaly

, or palpable masses. No guarding.


MUSCULOSKELETAL: Extremities without clubbing, cyanosis, or edema. No joint 

tenderness, effusion, or edema noted. No calf tenderness. 


NEUROLOGICAL: Awake and alert. Cranial nerves II through XII intact.  Motor and 

sensory grossly within normal limits. Normal speech.


Laboratory





Laboratory Tests








Test


  18


21:10


 


White Blood Count 7.0 


 


Red Blood Count 4.59 


 


Hemoglobin 13.8 


 


Hematocrit 41.0 


 


Mean Corpuscular Volume 89.3 


 


Mean Corpuscular Hemoglobin 30.1 


 


Mean Corpuscular Hemoglobin


Concent 33.8 


 


 


Red Cell Distribution Width 14.3 


 


Platelet Count 287 


 


Mean Platelet Volume 8.4 


 


Neutrophils (%) (Auto) 49.8 


 


Lymphocytes (%) (Auto) 30.1 


 


Monocytes (%) (Auto) 14.2 


 


Eosinophils (%) (Auto) 4.6 


 


Basophils (%) (Auto) 1.3 


 


Neutrophils # (Auto) 3.5 


 


Lymphocytes # (Auto) 2.1 


 


Monocytes # (Auto) 1.0 


 


Eosinophils # (Auto) 0.3 


 


Basophils # (Auto) 0.1 


 


CBC Comment DIFF FINAL 


 


Differential Comment  


 


Prothrombin Time 9.7 


 


Prothromb Time International


Ratio 1.0 


 


 


Activated Partial


Thromboplast Time 27.0 


 


 


Blood Urea Nitrogen 11 


 


Creatinine 1.07 


 


Random Glucose 90 


 


Calcium Level 9.5 


 


Sodium Level 139 


 


Potassium Level 3.8 


 


Chloride Level 106 


 


Carbon Dioxide Level 27.2 


 


Anion Gap 6 


 


Estimat Glomerular Filtration


Rate 75 


 


 


Lactic Acid Level 1.3 














 Date/Time


Source Procedure


Growth Status


 


 


 18 21:10


Blood Peripheral Aerobic Blood Culture


Pending Received


 


 18 21:10


Blood Peripheral Anaerobic Blood Culture


Pending Received





 18 21:10


Wound Foot Gram Stain


Pending Received


 


 18 21:10


Wound Foot Wound Culture


Pending Received








Result Diagram:  


18








Caprini VTE Risk Assessment


Caprini VTE Risk Assessment:  No/Low Risk (score <= 1)


Caprini Risk Assessment Model











 Point Value = 1          Point Value = 2  Point Value = 3  Point Value = 5


 


Age 41-60


Minor surgery


BMI > 25 kg/m2


Swollen legs


Varicose veins


Pregnancy or postpartum


History of unexplained or recurrent


   spontaneous 


Oral contraceptives or hormone


   replacement


Sepsis (< 1 month)


Serious lung disease, including


   pneumonia (< 1 month)


Abnormal pulmonary function


Acute myocardial infarction


Congestive heart failure (< 1 month)


History of inflammatory bowel disease


Medical patient at bed rest Age 61-74


Arthroscopic surgery


Major open surgery (> 45 min)


Laparoscopic surgery (> 45 min)


Malignancy


Confined to bed (> 72 hours)


Immobilizing plaster cast


Central venous access Age >= 75


History of VTE


Family history of VTE


Factor V Leiden


Prothrombin 49304X


Lupus anticoagulant


Anticardiolipin antibodies


Elevated serum homocysteine


Heparin-induced thrombocytopenia


Other congenital or acquired


   thrombophilia Stroke (< 1 month)


Elective arthroplasty


Hip, pelvis, or leg fracture


Acute spinal cord injury (< 1 month)








Prophylaxis Regimen











   Total Risk


Factor Score Risk Level Prophylaxis Regimen


 


0-1      Low Early ambulation


 


2 Moderate Order ONE of the following:


*Sequential Compression Device (SCD)


*Heparin 5000 units SQ BID


 


3-4 Higher Order ONE of the following medications:


*Heparin 5000 units SQ TID


*Enoxaparin/Lovenox 40 mg SQ daily (WT < 150 kg, CrCl > 30 mL/min)


*Enoxaparin/Lovenox 30 mg SQ daily (WT < 150 kg, CrCl > 10-29 mL/min)


*Enoxaparin/Lovenox 30 mg SQ BID (WT < 150 kg, CrCl > 30 mL/min)


AND/OR


*Sequential Compression Device (SCD)


 


5 or more Highest Order ONE of the following medications:


*Heparin 5000 units SQ TID (Preferred with Epidurals)


*Enoxaparin/Lovenox 40 mg SQ daily (WT < 150 kg, CrCl > 30 mL/min)


*Enoxaparin/Lovenox 30 mg SQ daily (WT < 150 kg, CrCl > 10-29 mL/min)


*Enoxaparin/Lovenox 30 mg SQ BID (WT < 150 kg, CrCl > 30 mL/min)


AND


*Sequential Compression Device (SCD)











Assessment and Plan


Assessment and Plan


Assessment/plan:





1.  Wound dehiscence


Orthopedic surgery consulted, plan for OR for washout in the a.m. 


Percocet for pain


Per orthopedic surgery, no antibiotics at this time





FEN


NPO


NS at 100 cc/hr


Electrolytes: Monitor and replete as needed











Ronit Machado MD 2018 00:52

## 2018-04-26 VITALS
SYSTOLIC BLOOD PRESSURE: 123 MMHG | TEMPERATURE: 98 F | RESPIRATION RATE: 16 BRPM | HEART RATE: 71 BPM | OXYGEN SATURATION: 97 % | DIASTOLIC BLOOD PRESSURE: 74 MMHG

## 2018-04-26 VITALS
HEART RATE: 71 BPM | SYSTOLIC BLOOD PRESSURE: 126 MMHG | RESPIRATION RATE: 18 BRPM | DIASTOLIC BLOOD PRESSURE: 69 MMHG | OXYGEN SATURATION: 98 % | TEMPERATURE: 97.5 F

## 2018-04-26 VITALS
RESPIRATION RATE: 18 BRPM | TEMPERATURE: 98.5 F | HEART RATE: 70 BPM | OXYGEN SATURATION: 96 % | SYSTOLIC BLOOD PRESSURE: 126 MMHG | DIASTOLIC BLOOD PRESSURE: 77 MMHG

## 2018-04-26 VITALS
DIASTOLIC BLOOD PRESSURE: 66 MMHG | OXYGEN SATURATION: 97 % | SYSTOLIC BLOOD PRESSURE: 114 MMHG | HEART RATE: 76 BPM | TEMPERATURE: 98.4 F | RESPIRATION RATE: 20 BRPM

## 2018-04-26 VITALS
DIASTOLIC BLOOD PRESSURE: 64 MMHG | TEMPERATURE: 98.2 F | OXYGEN SATURATION: 98 % | HEART RATE: 76 BPM | RESPIRATION RATE: 18 BRPM | SYSTOLIC BLOOD PRESSURE: 120 MMHG

## 2018-04-26 PROCEDURE — 0QBJ0ZZ EXCISION OF RIGHT FIBULA, OPEN APPROACH: ICD-10-PCS | Performed by: ORTHOPAEDIC SURGERY

## 2018-04-26 PROCEDURE — 0QBG0ZZ EXCISION OF RIGHT TIBIA, OPEN APPROACH: ICD-10-PCS | Performed by: ORTHOPAEDIC SURGERY

## 2018-04-26 RX ADMIN — LACTOBACILLUS ACIDOPHILUS / LACTOBACILLUS BULGARICUS SCH GM: 100 MILLION CFU STRENGTH GRANULES at 09:00

## 2018-04-26 RX ADMIN — CLINDAMYCIN PHOSPHATE SCH MLS/HR: 150 INJECTION, SOLUTION INTRAMUSCULAR; INTRAVENOUS at 08:00

## 2018-04-26 RX ADMIN — OXYTOCIN SCH MLS/HR: 10 INJECTION, SOLUTION INTRAMUSCULAR; INTRAVENOUS at 09:20

## 2018-04-26 RX ADMIN — OXYCODONE HYDROCHLORIDE AND ACETAMINOPHEN PRN TAB: 10; 325 TABLET ORAL at 21:56

## 2018-04-26 RX ADMIN — OXYTOCIN SCH MLS/HR: 10 INJECTION, SOLUTION INTRAMUSCULAR; INTRAVENOUS at 17:59

## 2018-04-26 RX ADMIN — CLINDAMYCIN PHOSPHATE SCH MLS/HR: 150 INJECTION, SOLUTION INTRAMUSCULAR; INTRAVENOUS at 01:06

## 2018-04-26 RX ADMIN — LACTOBACILLUS ACIDOPHILUS / LACTOBACILLUS BULGARICUS SCH GM: 100 MILLION CFU STRENGTH GRANULES at 18:10

## 2018-04-26 RX ADMIN — OXYCODONE HYDROCHLORIDE AND ACETAMINOPHEN PRN TAB: 10; 325 TABLET ORAL at 15:30

## 2018-04-26 RX ADMIN — PHENYTOIN SODIUM SCH MLS/HR: 50 INJECTION INTRAMUSCULAR; INTRAVENOUS at 16:45

## 2018-04-26 RX ADMIN — PHENYTOIN SODIUM SCH MLS/HR: 50 INJECTION INTRAMUSCULAR; INTRAVENOUS at 21:10

## 2018-04-26 RX ADMIN — MORPHINE SULFATE PRN MG: 2 INJECTION, SOLUTION INTRAMUSCULAR; INTRAVENOUS at 20:10

## 2018-04-26 RX ADMIN — Medication SCH ML: at 09:00

## 2018-04-26 RX ADMIN — PHENYTOIN SODIUM SCH MLS/HR: 50 INJECTION INTRAMUSCULAR; INTRAVENOUS at 04:26

## 2018-04-26 RX ADMIN — LACTOBACILLUS ACIDOPHILUS / LACTOBACILLUS BULGARICUS SCH GM: 100 MILLION CFU STRENGTH GRANULES at 12:33

## 2018-04-26 RX ADMIN — OXYCODONE HYDROCHLORIDE AND ACETAMINOPHEN PRN TAB: 10; 325 TABLET ORAL at 01:16

## 2018-04-26 RX ADMIN — STANDARDIZED SENNA CONCENTRATE AND DOCUSATE SODIUM SCH TAB: 8.6; 5 TABLET, FILM COATED ORAL at 09:00

## 2018-04-26 RX ADMIN — Medication SCH ML: at 20:10

## 2018-04-26 RX ADMIN — STANDARDIZED SENNA CONCENTRATE AND DOCUSATE SODIUM SCH TAB: 8.6; 5 TABLET, FILM COATED ORAL at 21:00

## 2018-04-26 RX ADMIN — CLINDAMYCIN PHOSPHATE SCH MLS/HR: 150 INJECTION, SOLUTION INTRAMUSCULAR; INTRAVENOUS at 15:24

## 2018-04-26 NOTE — PD.OP
cc:   Davy Bains MD


__________________________________________________





Operative Report


Date of Surgery:  Apr 26, 2018


Preoperative Diagnosis:  


Open right ankle wound with infection


Postoperative Diagnosis:  


Procedure:


Irrigation and debridement of right ankle and fibula, application of wound VAC 

dressing


Anesthesia:


General


Surgeon:


Davy Bains


Assistant(s):


ZION White PA-C


 


The surgical procedure was assisted by my physician assistant. My P.A. presence 

was necessary throughout this case for the manipulation and positioning of the 

surgical extremity. My P.A. was assisting me throughout the duration of this 

procedure. The skill set of a physician assistant was medically necessary to 

complete this procedure. During the surgical case the surgical tech was working 

at the back table and the physician assistant was directly assisting me.


Operation and Findings:


Contreras is known to me from previous treatment of right distal tibia and 

fibular fractures.  He was initially treated with external fixation.  He then 

had staged procedure for open reduction internal fixation of distal tibia and 

fibula.  After discharge from the hospital, patient has been completely 

noncompliant with postoperative instructions.  He was placed in a cast in clinic

, but he removed the cast himself 3 days later.  He was instructed to be 

completely nonweightbearing.  He has been walking full weightbearing without 

assistive devices.  He has been riding his motorcycle.  He was in the emergency 

room with complaints of open wound.  It is unclear how the open wound has been 

present.  Patient was seen and evaluated.  Informed consent was obtained 

preoperatively.  Operative site was marked.  I discussed with patient the fact 

that if infection continues he could end up with an amputation.  He will need 

to start complying with surgical instructions or he will be at increased risk 

of developing chronic infection and below-knee amputation.  He understands that 

he has been noncompliant but does not agree to changes and start following 

instructions.





Patient was brought to the operating and placed on or table.  He was given IV 

sedation and general anesthesia.  Antibiotics were held until cultures were 

obtained.  Timeout procedure was performed.  The right leg was prepped with 

alcohol followed by Hibiclens and draped in usual sterile fashion.  Procedure 

began with irrigation debridement of the wound.  Skin subcutaneous tissue, 

fascia, and bone were sharply debrided.  An excisional debridement was 

performed.  Tissue was obtained for cultures.  After cultures were obtained and 

antibiotics were given.  There was an area of exposed hardware of the fibula.  

The tibia hardware was not exposed in the open wound.  After thorough 

debridement the wound was thoroughly irrigated with pulsatile lavage.  At this 

point the wound appeared to be clean.  Next is to return to wound VAC dressing.

  A VAC dressing was cut to fit the wound.  VAC dressing was sealed 

appropriately.  Vera flow settings were utilized.  Vancomycin and gentamicin 

were added to the irrigation fluid.  Dressings were applied.  Patient was 

awakened and transferred to recovery in stable condition.











Davy Bains MD Apr 26, 2018 08:08

## 2018-04-26 NOTE — HHI.PR
Subjective


Remarks


The patient was seen following surgery.  He was resting comfortably.  He says 

the pain medications work.  He has no acute complaints.





Objective


Vitals





Vital Signs








  Date Time  Temp Pulse Resp B/P (MAP) Pulse Ox O2 Delivery O2 Flow Rate FiO2


 


4/26/18 16:59 98.4 76 20 114/66 (82) 97   


 


4/26/18 12:47 98.5 70 18 126/77 (93) 96   


 


4/26/18 10:30  70 16 112/67 (82) 96 Room Air  


 


4/26/18 09:20 98.1 74 16  95 Room Air  


 


4/26/18 09:15  80 15 109/65 (80) 94 Room Air  


 


4/26/18 09:00  79 16 111/65 (80) 96 Room Air  


 


4/26/18 08:30  88 18 124/70 (88) 98 Room Air  


 


4/26/18 08:26 97.6 92 20 117/66 (83) 100 Room Air  


 


4/26/18 04:12 98.0 71 16 123/74 (90) 97   


 


4/26/18 01:04 98.2 76 18 120/64 (82) 98   


 


4/25/18 21:00 99.0 85 18 132/98 (109) 99   














I/O      


 


 4/25/18 4/25/18 4/25/18 4/26/18 4/26/18 4/26/18





 07:00 15:00 23:00 07:00 15:00 23:00


 


Intake Total  240 ml  2000 ml 1020 ml 


 


Output Total    3600 ml 1310 ml 


 


Balance  240 ml  -1600 ml -290 ml 


 


      


 


Intake Oral  240 ml  1000 ml 220 ml 


 


IV Total    1000 ml 300 ml 


 


Other     500 ml 


 


Output Urine Total    3600 ml 1300 ml 


 


Estimated Blood Loss     10 ml 


 


# Voids     2 








Result Diagram:  


4/25/18 0413                                                                   

             4/25/18 0413





Objective Remarks


GENERAL: No distress.


HEAD: Atraumatic. Normocephalic. No temporal or scalp tenderness.


EYES: Pupils equal round and reactive. Extraocular motions intact. No scleral 

icterus. No injection or drainage. 


ENT: Nose without bleeding, purulent drainage or septal hematoma. Throat 

without erythema, tonsillar hypertrophy or exudate. Uvula midline. Airway 

patent.


NECK: Trachea midline. No JVD or lymphadenopathy. Supple, nontender, no 

meningeal signs.


CARDIOVASCULAR: Regular rate and rhythm without murmurs, gallops, or rubs. 


RESPIRATORY: Clear to auscultation. Breath sounds equal bilaterally. No wheezes

, rales, or rhonchi.  


GASTROINTESTINAL: Abdomen soft, non-tender, nondistended. No hepato-splenomegaly

, or palpable masses. No guarding.


MUSCULOSKELETAL: Wound vac in place on right. 


NEUROLOGICAL: Awake and alert. Cranial nerves II through XII intact.  Motor and 

sensory grossly within normal limits. Normal speech.


Procedures


I&D


Medications and IVs





Current Medications








 Medications


  (Trade)  Dose


 Ordered  Sig/Margo


 Route  Start Time


 Stop Time Status Last Admin


 


  (NS Flush)  2 ml  UNSCH  PRN


 IV FLUSH  4/24/18 23:00


     


 


 


  (NS Flush)  2 ml  BID


 IV FLUSH  4/25/18 09:00


     


 


 


  (Tylenol)  650 mg  Q4H  PRN


 PO  4/24/18 23:00


     


 


 


  (Zofran Inj)  4 mg  Q6H  PRN


 IVP  4/24/18 23:00


     


 


 


  (Narcan Inj)  0.4 mg  UNSCH  PRN


 IV PUSH  4/24/18 23:00


     


 


 


  (Milk Of


 Magnesia Liq)  30 ml  Q12H  PRN


 PO  4/24/18 23:00


     


 


 


  (Senokot)  17.2 mg  Q12H  PRN


 PO  4/24/18 23:00


     


 


 


  (Dulcolax Supp)  10 mg  DAILY  PRN


 RECTAL  4/24/18 23:00


     


 


 


  (Lactulose Liq)  30 ml  DAILY  PRN


 PO  4/24/18 23:00


     


 


 


  (Percocet  5-325


 Mg)  1 tab  Q4H  PRN


 PO  4/24/18 23:00


     


 


 


  (Percocet 


 Mg)  1 tab  Q4H  PRN


 PO  4/24/18 23:00


    4/26/18 01:16


 


 


 Sodium Chloride  1,000 ml @ 


 100 mls/hr  Q10H


 IV  4/25/18 00:45


    4/26/18 04:26


 


 


  (Lactinex Pkt)  1 gm  TID


 PO  4/25/18 18:00


    4/26/18 12:33


 


 


 Clindamycin


 Phosphate 600 mg/


 Sodium Chloride  104 ml @ 


 208 mls/hr  Q8H


 IV  4/25/18 16:00


    4/26/18 15:24


 


 


  (Grace-Colace)  1 tab  BID


 PO  4/25/18 21:00


     


 


 


  (Morphine Inj)  4 mg  Q4H  PRN


 IV PUSH  4/25/18 14:30


     


 


 


 Lactated Ringer's  1,000 ml @ 


 100 mls/hr  Q10H


 IV  4/26/18 07:59


    4/26/18 09:20


 


 


  (Bacitracin Oint


 Packet)  0.9 gm  UNSCH X1  PRN


 TOP  4/28/18 11:30


 4/30/18 11:29   


 


 


 Miscellaneous


 Information  ALL


 NURSING


 DEPARTME...  UNSCH  PRN


 .XX  4/26/18 11:30


 4/27/18 11:29   


 











A/P


Assessment and Plan


Wound dehiscence


Orthopedic surgery consult appreciated. Wound culture growing staph.  S/p 

irrigation and debridement of right ankle and fibula, application of wound VAC 

dressing 4/26. 


- pain control with a bowel regimen.


- rehab efforts.


- IS.


- wound care, anticoagulation, antibiotics and weightbearing per surgery.





Anemia


Mild.


- follow CBC.





PPx: Per surgery











Kristopher Hamilton DO Apr 26, 2018 17:13

## 2018-04-27 VITALS
DIASTOLIC BLOOD PRESSURE: 69 MMHG | HEART RATE: 74 BPM | TEMPERATURE: 98.5 F | SYSTOLIC BLOOD PRESSURE: 118 MMHG | OXYGEN SATURATION: 98 % | RESPIRATION RATE: 18 BRPM

## 2018-04-27 VITALS
RESPIRATION RATE: 18 BRPM | DIASTOLIC BLOOD PRESSURE: 67 MMHG | TEMPERATURE: 97.5 F | OXYGEN SATURATION: 98 % | SYSTOLIC BLOOD PRESSURE: 110 MMHG | HEART RATE: 69 BPM

## 2018-04-27 VITALS
OXYGEN SATURATION: 96 % | TEMPERATURE: 97.3 F | SYSTOLIC BLOOD PRESSURE: 120 MMHG | HEART RATE: 66 BPM | RESPIRATION RATE: 18 BRPM | DIASTOLIC BLOOD PRESSURE: 57 MMHG

## 2018-04-27 VITALS
HEART RATE: 72 BPM | TEMPERATURE: 98.2 F | DIASTOLIC BLOOD PRESSURE: 78 MMHG | SYSTOLIC BLOOD PRESSURE: 115 MMHG | OXYGEN SATURATION: 99 % | RESPIRATION RATE: 18 BRPM

## 2018-04-27 VITALS
HEART RATE: 64 BPM | SYSTOLIC BLOOD PRESSURE: 125 MMHG | DIASTOLIC BLOOD PRESSURE: 71 MMHG | RESPIRATION RATE: 18 BRPM | OXYGEN SATURATION: 95 % | TEMPERATURE: 97.5 F

## 2018-04-27 VITALS
RESPIRATION RATE: 18 BRPM | OXYGEN SATURATION: 97 % | TEMPERATURE: 98.3 F | HEART RATE: 75 BPM | DIASTOLIC BLOOD PRESSURE: 72 MMHG | SYSTOLIC BLOOD PRESSURE: 123 MMHG

## 2018-04-27 LAB
ERYTHROCYTE [DISTWIDTH] IN BLOOD BY AUTOMATED COUNT: 13.8 % (ref 11.6–17.2)
HCT VFR BLD CALC: 35.7 % (ref 39–51)
HGB BLD-MCNC: 12.1 GM/DL (ref 13–17)
MCH RBC QN AUTO: 29.6 PG (ref 27–34)
MCHC RBC AUTO-ENTMCNC: 33.8 % (ref 32–36)
MCV RBC AUTO: 87.7 FL (ref 80–100)
PLATELET # BLD: 234 TH/MM3 (ref 150–450)
PMV BLD AUTO: 8.3 FL (ref 7–11)
RBC # BLD AUTO: 4.07 MIL/MM3 (ref 4.5–5.9)
WBC # BLD AUTO: 6.5 TH/MM3 (ref 4–11)

## 2018-04-27 RX ADMIN — OXYTOCIN SCH MLS/HR: 10 INJECTION, SOLUTION INTRAMUSCULAR; INTRAVENOUS at 13:59

## 2018-04-27 RX ADMIN — STANDARDIZED SENNA CONCENTRATE AND DOCUSATE SODIUM SCH TAB: 8.6; 5 TABLET, FILM COATED ORAL at 09:00

## 2018-04-27 RX ADMIN — Medication SCH ML: at 09:00

## 2018-04-27 RX ADMIN — OXYTOCIN SCH MLS/HR: 10 INJECTION, SOLUTION INTRAMUSCULAR; INTRAVENOUS at 23:59

## 2018-04-27 RX ADMIN — LACTOBACILLUS ACIDOPHILUS / LACTOBACILLUS BULGARICUS SCH GM: 100 MILLION CFU STRENGTH GRANULES at 18:00

## 2018-04-27 RX ADMIN — OXACILLIN SODIUM SCH MLS/HR: 2 INJECTION, POWDER, FOR SOLUTION INTRAMUSCULAR; INTRAVENOUS at 16:00

## 2018-04-27 RX ADMIN — PHENYTOIN SODIUM SCH MLS/HR: 50 INJECTION INTRAMUSCULAR; INTRAVENOUS at 12:45

## 2018-04-27 RX ADMIN — OXYCODONE HYDROCHLORIDE AND ACETAMINOPHEN PRN TAB: 10; 325 TABLET ORAL at 20:07

## 2018-04-27 RX ADMIN — LACTOBACILLUS ACIDOPHILUS / LACTOBACILLUS BULGARICUS SCH GM: 100 MILLION CFU STRENGTH GRANULES at 13:00

## 2018-04-27 RX ADMIN — MORPHINE SULFATE PRN MG: 2 INJECTION, SOLUTION INTRAMUSCULAR; INTRAVENOUS at 21:09

## 2018-04-27 RX ADMIN — OXACILLIN SODIUM SCH MLS/HR: 2 INJECTION, POWDER, FOR SOLUTION INTRAMUSCULAR; INTRAVENOUS at 20:07

## 2018-04-27 RX ADMIN — PHENYTOIN SODIUM SCH MLS/HR: 50 INJECTION INTRAMUSCULAR; INTRAVENOUS at 22:45

## 2018-04-27 RX ADMIN — Medication SCH ML: at 20:09

## 2018-04-27 RX ADMIN — STANDARDIZED SENNA CONCENTRATE AND DOCUSATE SODIUM SCH TAB: 8.6; 5 TABLET, FILM COATED ORAL at 20:09

## 2018-04-27 RX ADMIN — OXYCODONE HYDROCHLORIDE AND ACETAMINOPHEN PRN TAB: 10; 325 TABLET ORAL at 06:07

## 2018-04-27 RX ADMIN — OXYTOCIN SCH MLS/HR: 10 INJECTION, SOLUTION INTRAMUSCULAR; INTRAVENOUS at 03:59

## 2018-04-27 RX ADMIN — LACTOBACILLUS ACIDOPHILUS / LACTOBACILLUS BULGARICUS SCH GM: 100 MILLION CFU STRENGTH GRANULES at 09:00

## 2018-04-27 NOTE — HHI.PR
Subjective


Remarks


Patient reports he is feeling okay.  Complaining of pain in the leg.  Pain 

medication is helping.





Objective


Vitals





Vital Signs








  Date Time  Temp Pulse Resp B/P (MAP) Pulse Ox O2 Delivery O2 Flow Rate FiO2


 


4/27/18 08:00 97.5 69 18 110/67 (81) 98   


 


4/27/18 04:00 97.5 64 18 125/71 (89) 95   


 


4/27/18 00:00 97.3 66 18 120/57 (78) 96   


 


4/26/18 20:00 97.5 71 18 126/69 (88) 98   


 


4/26/18 16:59 98.4 76 20 114/66 (82) 97   


 


4/26/18 12:47 98.5 70 18 126/77 (93) 96   














I/O      


 


 4/26/18 4/26/18 4/26/18 4/27/18 4/27/18 4/27/18





 07:00 15:00 23:00 07:00 15:00 23:00


 


Intake Total 2000 ml 1020 ml 104 ml   


 


Output Total 3600 ml 1310 ml 425 ml 550 ml  


 


Balance -1600 ml -290 ml -321 ml -550 ml  


 


      


 


Intake Oral 1000 ml 220 ml    


 


IV Total 1000 ml 300 ml 104 ml   


 


Other  500 ml    


 


Output Urine Total 3600 ml 1300 ml 425 ml 550 ml  


 


Estimated Blood Loss  10 ml    


 


# Voids  2    








Result Diagram:  


4/27/18 0617                                                                   

             4/25/18 0413





Objective Remarks


GENERAL: This is a well-nourished, well-developed patient, in no apparent 

distress.


CARDIOVASCULAR: Normal rate and regular rhythm without murmurs, gallops, or 

rubs. 


RESPIRATORY: Good respiratory efforts. Breath sounds equal and clear to 

auscultation bilaterally.


GASTROINTESTINAL: Abdomen soft, non-tender, non-distended. Normal active bowel 

sounds


MUSCULOSKELETAL: Right lower extremity dressing appear intact.  There is a 

wound VAC in place


NEURO:  Alert & Oriented x4 to person, place, time, situation.  Moves all ext x4


PSYCH: Appropriate mood and affect.


Procedures


Irrigation and debridement of right ankle and fibula, application of wound VAC 

dressing





A/P


Assessment and Plan


43-year-old male with:








Right Distal Tibia/Fibula Fx s/p ORIF with wound dehiscence and infection:


Orthopedic surgery consult appreciated. Wound culture growing staph.  S/p 

irrigation and debridement of right ankle and fibula, application of wound VAC 

dressing 4/26. 


- pain control with a bowel regimen.


- rehab efforts.


- IS.


-Infectious disease consulted to assist with antibiotics.





Anemia


Mild.


- follow CBC.





PPx: Per surgery











Cassy Barakat MD Apr 27, 2018 12:34

## 2018-04-27 NOTE — MB
cc:

Larry Saeed MD

****

 

 

DATE:

04/27/2018

 

DATE OF CONSULTATION:

04/27/2018

 

REQUESTING PHYSICIAN:

Dr. Davy Hirsch.

 

REASON:

Right leg wound with Staphylococcus positive.  The patient is 

post-washout procedure with wound VAC application.

 

HISTORY OF PRESENT ILLNESS:

This is a 43-year-old white male who had a prior surgery on his left 

ankle after he had a fracture of the right ankle in 03/2018.  He 

underwent open reduction and internal fixation of right distal tibia 

and fibular fractures.  The patient presented to [facility] 

on 04/08/2018 with dehiscence of the wound.  At that time he had no 

purulent drainage.  A culture of the wound showed Staphylococcus aureus.  

He developed severe pain in the right lateral ankle and was evaluated by 

Orthopedic Surgery and he was taken to surgery and underwent 

irrigation and debridement of the ankle and application of wound VAC. 

The patient was noted to be noncompliant with postoperative 

instructions.  He reportedly removed the cast that was placed 

previously.

 

Culture of the wound has Staphylococcus aureus.  It is sensitive to 

oxacillin.  The patient has no complaints besides pain in the right 

ankle.

 

PAST MEDICAL HISTORY:

1.  Herniated disk.

2.  Fracture of the right clavicle.

3.  Right tibia/fibula fracture.

 

ALLERGIES:

NO KNOWN DRUG ALLERGIES.

 

MEDICATIONS:

1.  Vancomycin

2.  Morphine sulfate p.r.n.

3.  Percocet 10 p.r.n.

 

SOCIAL HISTORY:

Positive tobacco use, occasional alcohol use.  Denies substance abuse.

 

FAMILY HISTORY:

Noncontributory.

 

REVIEW OF SYSTEMS:

Negative on a 10-point review.

 

PHYSICAL EXAMINATION:

GENERAL:  Well-developed male who is in no acute distress.  He is 

awake but a little drowsy.

VITAL SIGNS:  Temperature 97.5, /67, respirations 18, heart rate

69.

HEENT:  The head is atraumatic.  Extraocular movements are grossly 

intact.  Pupils reactive to light.  No icterus.  Oropharynx:  No 

lesions.

NECK:  Supple without adenopathy or swelling.

LUNGS:  Clear breath sounds bilaterally.

HEART:  Regular S1 and S2, without murmurs, rubs or gallops.

ABDOMEN:  Bowel sounds present.  Soft, nontender.

RECTAL:  Not performed.

EXTREMITIES:  The right leg has surgical dressing in place and a wound

VAC application.  There is serous drainage in the wound VAC tubing.

SKIN:  No rash.

NEUROLOGIC:  No gross focal finding.

PSYCHIATRIC:  The patient is calm and cooperative.

 

LABORATORY DATA:

WBC 6.5, platelets 234.

Creatinine 0.89, BUN 10, sodium 141.

 

IMPRESSION:

1.  Wound infection of the right ankle.  The patient is status post 

open reduction and internal fixation of right tibia/fibula fracture.

 

RECOMMENDATIONS:

1.  Discontinue vancomycin intravenous.

2.  Begin oxacillin intravenous.

3.  Follow the cultures until final.

 

The patient will require a course of antibiotics for the treatment of 

this wound, 4-6 weeks' duration for treatment of this infection.  

Because the hardware was not exposed, probably a 4-week course of 

treatment will suffice.

 

Thank you for this consultation.  I will monitor the patient's 

progress along with you.

 

 

__________________________________

Larry Saeed MD

 

 

FFSEAN/QUETA

D: 04/27/2018, 01:47 PM

T: 04/27/2018, 02:21 PM

Visit #: [id number]

Job #: [id number]

SORIN

## 2018-04-27 NOTE — PD.ORT.PN
Subjective


Subjective Remarks


Pain control with no new complaints





Objective


Vitals





Vital Signs








  Date Time  Temp Pulse Resp B/P (MAP) Pulse Ox O2 Delivery O2 Flow Rate FiO2


 


4/27/18 00:00 97.3 66 18 120/57 (78) 96   


 


4/26/18 20:00 97.5 71 18 126/69 (88) 98   


 


4/26/18 16:59 98.4 76 20 114/66 (82) 97   


 


4/26/18 12:47 98.5 70 18 126/77 (93) 96   


 


4/26/18 10:30  70 16 112/67 (82) 96 Room Air  


 


4/26/18 09:20 98.1 74 16  95 Room Air  


 


4/26/18 09:15  80 15 109/65 (80) 94 Room Air  


 


4/26/18 09:00  79 16 111/65 (80) 96 Room Air  


 


4/26/18 08:30  88 18 124/70 (88) 98 Room Air  


 


4/26/18 08:26 97.6 92 20 117/66 (83) 100 Room Air  














I/O      


 


 4/26/18 4/26/18 4/26/18 4/27/18 4/27/18 4/27/18





 07:00 15:00 23:00 07:00 15:00 23:00


 


Intake Total 2000 ml 1020 ml 104 ml   


 


Output Total 3600 ml 1310 ml 425 ml 550 ml  


 


Balance -1600 ml -290 ml -321 ml -550 ml  


 


      


 


Intake Oral 1000 ml 220 ml    


 


IV Total 1000 ml 300 ml 104 ml   


 


Other  500 ml    


 


Output Urine Total 3600 ml 1300 ml 425 ml 550 ml  


 


Estimated Blood Loss  10 ml    


 


# Voids  2    








Result Diagram:  


4/27/18 0617                                                                   

             4/25/18 0413





Objective Remarks


Right lower extremity: Clean dry dressings intact.  Wound VAC in place.  

Appropriate seal and Veriflo working appropriately





Assessment & Plan


Assessment and Plan


1) Right Distal Tibia/Fibula Fx s/p ORIF with wound dehiscence and infection 

POD 1


   -NWB


   -maintain VAC and dressing


   -Consult infectious disease











Kristopher Romo Jr. Apr 27, 2018 07:14

## 2018-04-28 VITALS
TEMPERATURE: 97.7 F | RESPIRATION RATE: 22 BRPM | DIASTOLIC BLOOD PRESSURE: 70 MMHG | HEART RATE: 61 BPM | OXYGEN SATURATION: 98 % | SYSTOLIC BLOOD PRESSURE: 118 MMHG

## 2018-04-28 VITALS
HEART RATE: 63 BPM | OXYGEN SATURATION: 96 % | RESPIRATION RATE: 19 BRPM | SYSTOLIC BLOOD PRESSURE: 132 MMHG | TEMPERATURE: 97.8 F | DIASTOLIC BLOOD PRESSURE: 61 MMHG

## 2018-04-28 VITALS
RESPIRATION RATE: 21 BRPM | OXYGEN SATURATION: 97 % | SYSTOLIC BLOOD PRESSURE: 130 MMHG | TEMPERATURE: 97.9 F | HEART RATE: 64 BPM | DIASTOLIC BLOOD PRESSURE: 71 MMHG

## 2018-04-28 VITALS
DIASTOLIC BLOOD PRESSURE: 72 MMHG | RESPIRATION RATE: 18 BRPM | OXYGEN SATURATION: 97 % | SYSTOLIC BLOOD PRESSURE: 130 MMHG | HEART RATE: 61 BPM | TEMPERATURE: 97.8 F

## 2018-04-28 VITALS
TEMPERATURE: 97.7 F | OXYGEN SATURATION: 98 % | RESPIRATION RATE: 18 BRPM | DIASTOLIC BLOOD PRESSURE: 72 MMHG | HEART RATE: 63 BPM | SYSTOLIC BLOOD PRESSURE: 121 MMHG

## 2018-04-28 VITALS
TEMPERATURE: 97.7 F | SYSTOLIC BLOOD PRESSURE: 122 MMHG | DIASTOLIC BLOOD PRESSURE: 57 MMHG | OXYGEN SATURATION: 98 % | HEART RATE: 62 BPM | RESPIRATION RATE: 18 BRPM

## 2018-04-28 RX ADMIN — OXYCODONE HYDROCHLORIDE AND ACETAMINOPHEN PRN TAB: 10; 325 TABLET ORAL at 04:56

## 2018-04-28 RX ADMIN — OXYCODONE HYDROCHLORIDE AND ACETAMINOPHEN PRN TAB: 10; 325 TABLET ORAL at 09:14

## 2018-04-28 RX ADMIN — OXYCODONE HYDROCHLORIDE AND ACETAMINOPHEN PRN TAB: 10; 325 TABLET ORAL at 17:35

## 2018-04-28 RX ADMIN — OXACILLIN SODIUM SCH MLS/HR: 2 INJECTION, POWDER, FOR SOLUTION INTRAMUSCULAR; INTRAVENOUS at 12:35

## 2018-04-28 RX ADMIN — OXYCODONE HYDROCHLORIDE AND ACETAMINOPHEN PRN TAB: 10; 325 TABLET ORAL at 21:15

## 2018-04-28 RX ADMIN — OXACILLIN SODIUM SCH MLS/HR: 2 INJECTION, POWDER, FOR SOLUTION INTRAMUSCULAR; INTRAVENOUS at 17:35

## 2018-04-28 RX ADMIN — PHENYTOIN SODIUM SCH MLS/HR: 50 INJECTION INTRAMUSCULAR; INTRAVENOUS at 08:32

## 2018-04-28 RX ADMIN — PHENYTOIN SODIUM SCH MLS/HR: 50 INJECTION INTRAMUSCULAR; INTRAVENOUS at 17:35

## 2018-04-28 RX ADMIN — LACTOBACILLUS ACIDOPHILUS / LACTOBACILLUS BULGARICUS SCH GM: 100 MILLION CFU STRENGTH GRANULES at 08:32

## 2018-04-28 RX ADMIN — OXACILLIN SODIUM SCH MLS/HR: 2 INJECTION, POWDER, FOR SOLUTION INTRAMUSCULAR; INTRAVENOUS at 00:41

## 2018-04-28 RX ADMIN — OXACILLIN SODIUM SCH MLS/HR: 2 INJECTION, POWDER, FOR SOLUTION INTRAMUSCULAR; INTRAVENOUS at 08:31

## 2018-04-28 RX ADMIN — Medication SCH ML: at 20:01

## 2018-04-28 RX ADMIN — OXYCODONE HYDROCHLORIDE AND ACETAMINOPHEN PRN TAB: 10; 325 TABLET ORAL at 00:41

## 2018-04-28 RX ADMIN — OXYTOCIN SCH MLS/HR: 10 INJECTION, SOLUTION INTRAMUSCULAR; INTRAVENOUS at 19:59

## 2018-04-28 RX ADMIN — LACTOBACILLUS ACIDOPHILUS / LACTOBACILLUS BULGARICUS SCH GM: 100 MILLION CFU STRENGTH GRANULES at 12:35

## 2018-04-28 RX ADMIN — Medication SCH ML: at 08:32

## 2018-04-28 RX ADMIN — STANDARDIZED SENNA CONCENTRATE AND DOCUSATE SODIUM SCH TAB: 8.6; 5 TABLET, FILM COATED ORAL at 20:01

## 2018-04-28 RX ADMIN — OXACILLIN SODIUM SCH MLS/HR: 2 INJECTION, POWDER, FOR SOLUTION INTRAMUSCULAR; INTRAVENOUS at 04:56

## 2018-04-28 RX ADMIN — OXYCODONE HYDROCHLORIDE AND ACETAMINOPHEN PRN TAB: 10; 325 TABLET ORAL at 12:36

## 2018-04-28 RX ADMIN — OXACILLIN SODIUM SCH MLS/HR: 2 INJECTION, POWDER, FOR SOLUTION INTRAMUSCULAR; INTRAVENOUS at 20:01

## 2018-04-28 RX ADMIN — STANDARDIZED SENNA CONCENTRATE AND DOCUSATE SODIUM SCH TAB: 8.6; 5 TABLET, FILM COATED ORAL at 08:32

## 2018-04-28 RX ADMIN — LACTOBACILLUS ACIDOPHILUS / LACTOBACILLUS BULGARICUS SCH GM: 100 MILLION CFU STRENGTH GRANULES at 17:35

## 2018-04-28 RX ADMIN — OXYTOCIN SCH MLS/HR: 10 INJECTION, SOLUTION INTRAMUSCULAR; INTRAVENOUS at 08:32

## 2018-04-28 NOTE — HHI.PR
Subjective


Remarks


Patient inquired about just having the leg amputated.  We had an extensive 

discussion about the goal of treatment for now to avoid amputation.





Objective


Vitals





Vital Signs








  Date Time  Temp Pulse Resp B/P (MAP) Pulse Ox O2 Delivery O2 Flow Rate FiO2


 


4/28/18 08:25 97.8 61 18 130/72 (91) 97   


 


4/28/18 04:00 97.7 61 22 118/70 (86) 98   


 


4/28/18 00:00 97.8 63 19 132/61 (84) 96   


 


4/27/18 20:00 98.3 75 18 123/72 (89) 97   


 


4/27/18 16:00 98.5 74 18 118/69 (85) 98   


 


4/27/18 12:00 98.2 72 18 115/78 (90) 99   














I/O      


 


 4/27/18 4/27/18 4/27/18 4/28/18 4/28/18 4/28/18





 07:00 15:00 23:00 07:00 15:00 23:00


 


Output Total 550 ml   2650 ml 740 ml 


 


Balance -550 ml   -2650 ml -740 ml 


 


      


 


Output Urine Total 550 ml   1650 ml 740 ml 


 


Drainage Total    1000 ml  


 


# Bowel Movements    0  








Result Diagram:  


4/27/18 0617                                                                   

             4/25/18 0413





Objective Remarks


GENERAL: This is a well-nourished, well-developed patient, in no apparent 

distress.


CARDIOVASCULAR: Normal rate and regular rhythm without murmurs, gallops, or 

rubs. 


RESPIRATORY: Good respiratory efforts. Breath sounds equal and clear to 

auscultation bilaterally.


GASTROINTESTINAL: Abdomen soft, non-tender, non-distended. Normal active bowel 

sounds


MUSCULOSKELETAL: Right lower extremity dressing appear intact.  There is a 

wound VAC in place


NEURO:  Alert & Oriented x4 to person, place, time, situation.  Moves all ext x4


PSYCH: Appropriate mood and affect.


Procedures


Irrigation and debridement of right ankle and fibula, application of wound VAC 

dressing





A/P


Assessment and Plan


43-year-old male with:








Right Distal Tibia/Fibula Fx s/p ORIF with wound dehiscence and infection:


Orthopedic surgery consult appreciated. Wound culture growing staph.  S/p 

irrigation and debridement of right ankle and fibula, application of wound VAC 

dressing 4/26. 


- pain control with a bowel regimen.


- rehab efforts.


- IS.


-Infectious disease following. Cultures growing staph aureus. Switched to 

Oxacillin. Will need about 4 weeks of IV antibiotics.  Patient is aware.  He 

inquired about amputation related to try to treat the infection.  Advised him 

to have further discussion with orthopedics but for now I advised him the goal 

of treatment is to continue with antibiotics





Anemia


Mild.


- follow CBC.





PPx: When ok with surgery











Cassy Barakat MD Apr 28, 2018 09:34

## 2018-04-29 VITALS
RESPIRATION RATE: 16 BRPM | DIASTOLIC BLOOD PRESSURE: 81 MMHG | OXYGEN SATURATION: 99 % | HEART RATE: 63 BPM | SYSTOLIC BLOOD PRESSURE: 129 MMHG | TEMPERATURE: 98.1 F

## 2018-04-29 VITALS
SYSTOLIC BLOOD PRESSURE: 134 MMHG | TEMPERATURE: 97.5 F | RESPIRATION RATE: 20 BRPM | HEART RATE: 76 BPM | OXYGEN SATURATION: 100 % | DIASTOLIC BLOOD PRESSURE: 70 MMHG

## 2018-04-29 VITALS
TEMPERATURE: 97.7 F | OXYGEN SATURATION: 100 % | DIASTOLIC BLOOD PRESSURE: 70 MMHG | HEART RATE: 69 BPM | RESPIRATION RATE: 20 BRPM | SYSTOLIC BLOOD PRESSURE: 136 MMHG

## 2018-04-29 RX ADMIN — OXYCODONE HYDROCHLORIDE AND ACETAMINOPHEN PRN TAB: 10; 325 TABLET ORAL at 20:23

## 2018-04-29 RX ADMIN — Medication SCH ML: at 19:09

## 2018-04-29 RX ADMIN — LACTOBACILLUS ACIDOPHILUS / LACTOBACILLUS BULGARICUS SCH GM: 100 MILLION CFU STRENGTH GRANULES at 16:37

## 2018-04-29 RX ADMIN — OXYTOCIN SCH MLS/HR: 10 INJECTION, SOLUTION INTRAMUSCULAR; INTRAVENOUS at 15:59

## 2018-04-29 RX ADMIN — OXACILLIN SODIUM SCH MLS/HR: 2 INJECTION, POWDER, FOR SOLUTION INTRAMUSCULAR; INTRAVENOUS at 19:08

## 2018-04-29 RX ADMIN — OXACILLIN SODIUM SCH MLS/HR: 2 INJECTION, POWDER, FOR SOLUTION INTRAMUSCULAR; INTRAVENOUS at 12:00

## 2018-04-29 RX ADMIN — LACTOBACILLUS ACIDOPHILUS / LACTOBACILLUS BULGARICUS SCH GM: 100 MILLION CFU STRENGTH GRANULES at 12:17

## 2018-04-29 RX ADMIN — OXACILLIN SODIUM SCH MLS/HR: 2 INJECTION, POWDER, FOR SOLUTION INTRAMUSCULAR; INTRAVENOUS at 01:12

## 2018-04-29 RX ADMIN — STANDARDIZED SENNA CONCENTRATE AND DOCUSATE SODIUM SCH TAB: 8.6; 5 TABLET, FILM COATED ORAL at 19:09

## 2018-04-29 RX ADMIN — OXACILLIN SODIUM SCH MLS/HR: 2 INJECTION, POWDER, FOR SOLUTION INTRAMUSCULAR; INTRAVENOUS at 07:32

## 2018-04-29 RX ADMIN — Medication SCH ML: at 08:38

## 2018-04-29 RX ADMIN — OXACILLIN SODIUM SCH MLS/HR: 2 INJECTION, POWDER, FOR SOLUTION INTRAMUSCULAR; INTRAVENOUS at 05:54

## 2018-04-29 RX ADMIN — LACTOBACILLUS ACIDOPHILUS / LACTOBACILLUS BULGARICUS SCH GM: 100 MILLION CFU STRENGTH GRANULES at 08:39

## 2018-04-29 RX ADMIN — ONDANSETRON PRN MG: 2 INJECTION, SOLUTION INTRAMUSCULAR; INTRAVENOUS at 02:37

## 2018-04-29 RX ADMIN — PHENYTOIN SODIUM SCH MLS/HR: 50 INJECTION INTRAMUSCULAR; INTRAVENOUS at 14:45

## 2018-04-29 RX ADMIN — ONDANSETRON PRN MG: 2 INJECTION, SOLUTION INTRAMUSCULAR; INTRAVENOUS at 20:22

## 2018-04-29 RX ADMIN — STANDARDIZED SENNA CONCENTRATE AND DOCUSATE SODIUM SCH TAB: 8.6; 5 TABLET, FILM COATED ORAL at 09:00

## 2018-04-29 RX ADMIN — OXACILLIN SODIUM SCH MLS/HR: 2 INJECTION, POWDER, FOR SOLUTION INTRAMUSCULAR; INTRAVENOUS at 16:00

## 2018-04-29 RX ADMIN — PHENYTOIN SODIUM SCH MLS/HR: 50 INJECTION INTRAMUSCULAR; INTRAVENOUS at 04:45

## 2018-04-29 RX ADMIN — OXYCODONE HYDROCHLORIDE AND ACETAMINOPHEN PRN TAB: 10; 325 TABLET ORAL at 01:12

## 2018-04-29 RX ADMIN — OXYTOCIN SCH MLS/HR: 10 INJECTION, SOLUTION INTRAMUSCULAR; INTRAVENOUS at 05:59

## 2018-04-29 NOTE — HHI.PR
Subjective


Remarks


Patient reports pain is ok. Frustrated because repeat debridement is scheduled 

for tomorrow. Frustrated that he is not healing faster.





Objective


Vitals





Vital Signs








  Date Time  Temp Pulse Resp B/P (MAP) Pulse Ox O2 Delivery O2 Flow Rate FiO2


 


4/29/18 04:00 97.7 69 20 136/70 (92) 100   


 


4/29/18 00:00 97.5 76 20 134/70 (91) 100   


 


4/28/18 20:00 97.9 64 21 130/71 (90) 97   


 


4/28/18 16:05 97.7 63 18 121/72 (88) 98   


 


4/28/18 12:32 97.7 62 18 122/57 (78) 98   














I/O      


 


 4/28/18 4/28/18 4/28/18 4/29/18 4/29/18 4/29/18





 07:00 15:00 23:00 07:00 15:00 23:00


 


Intake Total   100 ml 100 ml  


 


Output Total 2650 ml 740 ml 300 ml  420 ml 


 


Balance -2650 ml -740 ml -200 ml 100 ml -420 ml 


 


      


 


IV Total   100 ml 100 ml  


 


Output Urine Total 1650 ml 740 ml 300 ml  420 ml 


 


Drainage Total 1000 ml     


 


# Bowel Movements 0     








Result Diagram:  


4/27/18 0617                                                                   

             4/25/18 0413





Objective Remarks


GENERAL: This is a well-nourished, well-developed patient, in no apparent 

distress.


CARDIOVASCULAR: Normal rate and regular rhythm without murmurs, gallops, or 

rubs. 


RESPIRATORY: Good respiratory efforts. Breath sounds equal and clear to 

auscultation bilaterally.


GASTROINTESTINAL: Abdomen soft, non-tender, non-distended. Normal active bowel 

sounds


MUSCULOSKELETAL: Right lower extremity dressing appear intact.  There is a 

wound VAC in place


NEURO:  Alert & Oriented x4 to person, place, time, situation.  Moves all ext x4


PSYCH: Appropriate mood and affect.


Procedures


Irrigation and debridement of right ankle and fibula, application of wound VAC 

dressing





A/P


Assessment and Plan


43-year-old male with:








Right Distal Tibia/Fibula Fx s/p ORIF with wound dehiscence and infection:


Orthopedic surgery consult appreciated. Wound culture growing staph.  S/p 

irrigation and debridement of right ankle and fibula, application of wound VAC 

dressing 4/26. 


- pain control with a bowel regimen.


- rehab efforts.


- IS.


-Infectious disease following. Cultures growing staph aureus. Switched to 

Oxacillin. Will need about 4 weeks of IV antibiotics.  Patient is aware.  He 

inquired about amputation related to try to treat the infection.  Advised him 

to continue with the recommended treatment. 


- Repeat debridement scheduled for tomorrow





Anemia


Mild.


- follow CBC.





PPx: When ok with surgery











Cassy Barakat MD Apr 29, 2018 11:07

## 2018-04-29 NOTE — PD.ORT.PN
Subjective


Subjective Remarks


Contreras is awake and alert.  He is anxious.  Pain control





Objective


Vitals





Vital Signs








  Date Time  Temp Pulse Resp B/P (MAP) Pulse Ox O2 Delivery O2 Flow Rate FiO2


 


4/29/18 04:00 97.7 69 20 136/70 (92) 100   


 


4/29/18 00:00 97.5 76 20 134/70 (91) 100   


 


4/28/18 20:00 97.9 64 21 130/71 (90) 97   


 


4/28/18 16:05 97.7 63 18 121/72 (88) 98   


 


4/28/18 12:32 97.7 62 18 122/57 (78) 98   


 


4/28/18 08:25 97.8 61 18 130/72 (91) 97   














I/O      


 


 4/28/18 4/28/18 4/28/18 4/29/18 4/29/18 4/29/18





 07:00 15:00 23:00 07:00 15:00 23:00


 


Intake Total   100 ml 100 ml  


 


Output Total 2650 ml 740 ml 300 ml   


 


Balance -2650 ml -740 ml -200 ml 100 ml  


 


      


 


IV Total   100 ml 100 ml  


 


Output Urine Total 1650 ml 740 ml 300 ml   


 


Drainage Total 1000 ml     


 


# Bowel Movements 0     








Result Diagram:  


4/27/18 0617                                                                   

             4/25/18 0413





Objective Remarks


Right lower extremity: Clean dry dressings intact.  Wound VAC in place.  VAC 

seal and Veriflo working appropriately





Assessment & Plan


Assessment and Plan


1) Right Distal Tibia/Fibula Fx s/p ORIF with wound dehiscence and infection 

POD 2


   -NWB


   -maintain VAC and dressing


   -Consult infectious disease


2) plan on surgery Monday or Tuesday for irrigation and debridement with VAC 

dressing change











Davy Hirsch MD Apr 29, 2018 07:33

## 2018-04-30 VITALS
TEMPERATURE: 97.9 F | HEART RATE: 74 BPM | OXYGEN SATURATION: 99 % | DIASTOLIC BLOOD PRESSURE: 78 MMHG | RESPIRATION RATE: 18 BRPM | SYSTOLIC BLOOD PRESSURE: 129 MMHG

## 2018-04-30 VITALS
RESPIRATION RATE: 18 BRPM | SYSTOLIC BLOOD PRESSURE: 117 MMHG | DIASTOLIC BLOOD PRESSURE: 69 MMHG | HEART RATE: 79 BPM | OXYGEN SATURATION: 100 % | TEMPERATURE: 97.9 F

## 2018-04-30 VITALS
OXYGEN SATURATION: 98 % | SYSTOLIC BLOOD PRESSURE: 122 MMHG | HEART RATE: 59 BPM | RESPIRATION RATE: 16 BRPM | DIASTOLIC BLOOD PRESSURE: 72 MMHG | TEMPERATURE: 97.6 F

## 2018-04-30 VITALS
OXYGEN SATURATION: 100 % | TEMPERATURE: 97.4 F | DIASTOLIC BLOOD PRESSURE: 73 MMHG | RESPIRATION RATE: 18 BRPM | SYSTOLIC BLOOD PRESSURE: 127 MMHG | HEART RATE: 68 BPM

## 2018-04-30 VITALS
DIASTOLIC BLOOD PRESSURE: 62 MMHG | RESPIRATION RATE: 20 BRPM | TEMPERATURE: 98 F | HEART RATE: 78 BPM | SYSTOLIC BLOOD PRESSURE: 111 MMHG | OXYGEN SATURATION: 100 %

## 2018-04-30 PROCEDURE — 0YQHXZZ REPAIR RIGHT LOWER LEG, EXTERNAL APPROACH: ICD-10-PCS | Performed by: ORTHOPAEDIC SURGERY

## 2018-04-30 PROCEDURE — 0QBG0ZZ EXCISION OF RIGHT TIBIA, OPEN APPROACH: ICD-10-PCS | Performed by: ORTHOPAEDIC SURGERY

## 2018-04-30 PROCEDURE — 0QBJ0ZZ EXCISION OF RIGHT FIBULA, OPEN APPROACH: ICD-10-PCS | Performed by: ORTHOPAEDIC SURGERY

## 2018-04-30 RX ADMIN — LACTOBACILLUS ACIDOPHILUS / LACTOBACILLUS BULGARICUS SCH GM: 100 MILLION CFU STRENGTH GRANULES at 12:49

## 2018-04-30 RX ADMIN — OXYCODONE HYDROCHLORIDE AND ACETAMINOPHEN PRN TAB: 10; 325 TABLET ORAL at 06:15

## 2018-04-30 RX ADMIN — OXYCODONE HYDROCHLORIDE AND ACETAMINOPHEN PRN TAB: 10; 325 TABLET ORAL at 18:19

## 2018-04-30 RX ADMIN — PHENYTOIN SODIUM SCH MLS/HR: 50 INJECTION INTRAMUSCULAR; INTRAVENOUS at 10:45

## 2018-04-30 RX ADMIN — MORPHINE SULFATE PRN MG: 2 INJECTION, SOLUTION INTRAMUSCULAR; INTRAVENOUS at 15:35

## 2018-04-30 RX ADMIN — OXYTOCIN SCH MLS/HR: 10 INJECTION, SOLUTION INTRAMUSCULAR; INTRAVENOUS at 21:56

## 2018-04-30 RX ADMIN — OXACILLIN SODIUM SCH MLS/HR: 2 INJECTION, POWDER, FOR SOLUTION INTRAMUSCULAR; INTRAVENOUS at 06:01

## 2018-04-30 RX ADMIN — PHENYTOIN SODIUM SCH MLS/HR: 50 INJECTION INTRAMUSCULAR; INTRAVENOUS at 15:38

## 2018-04-30 RX ADMIN — PHENYTOIN SODIUM SCH MLS/HR: 50 INJECTION INTRAMUSCULAR; INTRAVENOUS at 20:45

## 2018-04-30 RX ADMIN — STANDARDIZED SENNA CONCENTRATE AND DOCUSATE SODIUM SCH TAB: 8.6; 5 TABLET, FILM COATED ORAL at 09:00

## 2018-04-30 RX ADMIN — OXYTOCIN SCH MLS/HR: 10 INJECTION, SOLUTION INTRAMUSCULAR; INTRAVENOUS at 11:59

## 2018-04-30 RX ADMIN — ONDANSETRON PRN MG: 2 INJECTION, SOLUTION INTRAMUSCULAR; INTRAVENOUS at 15:35

## 2018-04-30 RX ADMIN — OXYCODONE HYDROCHLORIDE AND ACETAMINOPHEN PRN TAB: 10; 325 TABLET ORAL at 01:31

## 2018-04-30 RX ADMIN — OXYTOCIN SCH MLS/HR: 10 INJECTION, SOLUTION INTRAMUSCULAR; INTRAVENOUS at 01:59

## 2018-04-30 RX ADMIN — Medication SCH ML: at 21:00

## 2018-04-30 RX ADMIN — STANDARDIZED SENNA CONCENTRATE AND DOCUSATE SODIUM SCH TAB: 8.6; 5 TABLET, FILM COATED ORAL at 21:00

## 2018-04-30 RX ADMIN — OXYCODONE HYDROCHLORIDE AND ACETAMINOPHEN PRN TAB: 10; 325 TABLET ORAL at 12:48

## 2018-04-30 RX ADMIN — OXACILLIN SODIUM SCH MLS/HR: 2 INJECTION, POWDER, FOR SOLUTION INTRAMUSCULAR; INTRAVENOUS at 07:47

## 2018-04-30 RX ADMIN — Medication SCH ML: at 09:28

## 2018-04-30 RX ADMIN — MORPHINE SULFATE PRN MG: 2 INJECTION, SOLUTION INTRAMUSCULAR; INTRAVENOUS at 21:50

## 2018-04-30 RX ADMIN — MORPHINE SULFATE PRN MG: 2 INJECTION, SOLUTION INTRAMUSCULAR; INTRAVENOUS at 09:28

## 2018-04-30 RX ADMIN — ONDANSETRON PRN MG: 2 INJECTION, SOLUTION INTRAMUSCULAR; INTRAVENOUS at 09:27

## 2018-04-30 RX ADMIN — ONDANSETRON PRN MG: 2 INJECTION, SOLUTION INTRAMUSCULAR; INTRAVENOUS at 21:47

## 2018-04-30 RX ADMIN — OXYCODONE HYDROCHLORIDE AND ACETAMINOPHEN PRN TAB: 10; 325 TABLET ORAL at 22:52

## 2018-04-30 RX ADMIN — OXACILLIN SODIUM SCH MLS/HR: 2 INJECTION, POWDER, FOR SOLUTION INTRAMUSCULAR; INTRAVENOUS at 01:30

## 2018-04-30 RX ADMIN — OXACILLIN SODIUM SCH MLS/HR: 2 INJECTION, POWDER, FOR SOLUTION INTRAMUSCULAR; INTRAVENOUS at 15:38

## 2018-04-30 RX ADMIN — LACTOBACILLUS ACIDOPHILUS / LACTOBACILLUS BULGARICUS SCH GM: 100 MILLION CFU STRENGTH GRANULES at 18:00

## 2018-04-30 RX ADMIN — OXACILLIN SODIUM SCH MLS/HR: 2 INJECTION, POWDER, FOR SOLUTION INTRAMUSCULAR; INTRAVENOUS at 21:36

## 2018-04-30 RX ADMIN — LACTOBACILLUS ACIDOPHILUS / LACTOBACILLUS BULGARICUS SCH GM: 100 MILLION CFU STRENGTH GRANULES at 09:27

## 2018-04-30 RX ADMIN — PHENYTOIN SODIUM SCH MLS/HR: 50 INJECTION INTRAMUSCULAR; INTRAVENOUS at 01:35

## 2018-04-30 NOTE — PD.PSY.CON
Provisional Diagnosis


Admission Date


Apr 25, 2018 at 14:11


Axis I.


Adjustment disorder with mixed anxiety and depressed mood


Axis II.


Cluster B traits identified


Axis III.


No significant medical history





History of Present Illness


Service


Psychiatry


Consult Requested By


Medical team


Reason for Consult


Anxiety and depression


Primary Care Physician


No Primary Care Physician


HPI


The patient is a 43-year-old man, domiciled alone in [facility], single, unemployed

, without no previous psychiatric history, no previous hospitalizations, one 

suicidal attempt at the age of 18 years old by overdosing, no significant 

medical history, who was hospitalized due to Right Distal Tibia/Fibula Fx s/p 

ORIF with wound dehiscence and infection. Orthopedic surgery consult 

appreciated. Wound culture growing staph.  S/p irrigation and debridement of 

right ankle and fibula, application of wound VAC dressing 4/26.  The patient 

was consulted to psychiatry due to symptoms of depression and anxiety.  

Psychiatric evaluation the patient at the beginning irritable, oppositional, 

even verbally hostile.  The patient reports that he has been frustrated due to 

the lack of care of the nurses.  He says that is very frustrating for him to 

see the nurses coming and going, "and many of their do not really care about 

what they are doing".  The patient says that the reason of his frustration and 

sadness is due to the length of his hospitalization and to the fact that he is 

going to have to spend a week inside the room.  Patient reports that he is very 

active person, likes to be in control of his life, he do not appreciate to be 

in close places.  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 

the control myself, demonstrated by nurses, having others taking care of me and 

taken decisions for me reminds me a lot my days of nursing home".  But, the patient 

states that he enjoys life, he is looking forward to get better, to get out of 

the hospital, to restart his life.  He reports poor sleep at night, episodic 

anxiety, he denies hopelessness, denies helplessness, denies worthlessness, 

denies anhedonia, denies suicidal enemas ideation, he denies visual and 

auditory hallucinations.  Patient denies the use of illegal drugs or alcohol.





Review of Systems


Constitutional:  DENIES: Diaphoretic episodes, Fatigue, Fever, Weight gain, 

Weight loss, Chills, Dizziness, Change in appetite, Night Sweats


Endocrine:  DENIES: Heat/cold intolerance, Polydipsia, Polyuria, Polyphagia


Eyes:  DENIES: Blurred vision, Diplopia, Eye inflammation, Eye pain, Vision loss

, Photosensitivity, Double Vision


Ears, nose, mouth, throat:  DENIES: Tinnitus, Hearing loss, Vertigo, Nasal 

discharge, Oral lesions, Throat pain, Hoarseness, Ear Pain, Running Nose, 

Epistaxis, Sinus Pain, Toothache, Odynophagia


Respiratory:  DENIES: Apneas, Cough, Snoring, Wheezing, Hemoptysis, Sputum 

production, Shortness of breath


Cardiovascular:  DENIES: Chest pain, Palpitations, Syncope, Dyspnea on Exertion

, PND, Lower Extremity Edema, Orthopnea, Claudication


Gastrointestinal:  DENIES: Abdominal pain, Black stools, Bloody stools, 

Constipation, Diarrhea, Nausea, Vomiting, Difficulty Swallowing, Anorexia


Genitourinary:  DENIES: Sexual dysfunction, Urinary frequency, Urinary 

incontinence, Urgency, Hematuria, Dysuria, Nocturia, Penile Discharge, 

Testicular Pain, Testicular Swelling


Musculoskeletal:  DENIES: Joint pain, Muscle aches, Stiffness, Joint Swelling, 

Back pain, Neck pain


Integumentary:  DENIES: Abnormal pigmentation, Nail changes, Pruritus, Rash


Hematologic/lymphatic:  DENIES: Bruising, Lymphadenopathy


Immunologic/allergic:  DENIES: Eczema, Urticaria


Neurologic:  DENIES: Abnormal gait, Headache, Localized weakness, Paresthesias, 

Seizures, Speech Problems, Tremor, Poor Balance


Psychiatric:  COMPLAINS OF: Anxiety, DENIES: Confusion, Mood changes, Depression

, Hallucinations, Agitation, Suicidal Ideation, Homicidal Ideation, Delusions





Past Family Social History


Coded Allergies:  


     No Known Allergies (Verified  Allergy, Mild, 4/24/18)


Discontinued Scripts


Rivaroxaban (Xarelto) 10 Mg Tab, 10 MG PO DAILY for Blood Clot Prevention, #14 

TAB 0 Refills


   Prov:Kristopher Romo Jr.         3/8/18


Oxycodone-Acetaminophen (Endocet)  mg Tab, 1 TAB PO Q4H Y for Pain 

Management, #60 TAB 0 Refills


   Prov:Kristopher Romo Jr.         3/8/18


Rivaroxaban (Xarelto) 10 Mg Tab, 10 MG PO DAILY for Blood Clot Prevention for 

14 Days, #14 TAB 0 Refills


   Prov:Jalil Jacobs/First Assist PA         2/16/18


Oxycodone-Acetaminophen (Endocet)  mg Tab, 1 TAB PO Q4H Y for Pain 

Management, #42 TAB 0 Refills


   Prov:Jalil Jacobs PA/First Assist PA         2/16/18


Bedside Commode (Bedside Commode) 1 Mis Alana EA SuryXX AS DIRECTED, #1


   Prov:Wilder Swan ARNP         2/14/18


Wheelchair Elevated Leg (Wheelchair Elevated Leg) 1 Mis Alana EA SuryXX AS DIRECTED

, #1 0 Refills


   Prov:Wilder Swan ARNP         2/14/18





Current Medications








 Medications


  (Trade)  Dose


 Ordered  Sig/Margo


 Route  Start Time


 Stop Time Status Last Admin


 


  (NS Flush)  2 ml  UN[facility]  PRN


 IV FLUSH  4/24/18 23:00


     


 


 


  (NS Flush)  2 ml  BID


 IV FLUSH  4/25/18 09:00


    4/30/18 09:28


 


 


  (Tylenol)  650 mg  Q4H  PRN


 PO  4/24/18 23:00


     


 


 


  (Zofran Inj)  4 mg  Q6H  PRN


 IVP  4/24/18 23:00


    4/30/18 09:27


 


 


  (Narcan Inj)  0.4 mg  UNSCH  PRN


 IV PUSH  4/24/18 23:00


     


 


 


  (Milk Of


 Magnesia Liq)  30 ml  Q12H  PRN


 PO  4/24/18 23:00


     


 


 


  (Senokot)  17.2 mg  Q12H  PRN


 PO  4/24/18 23:00


     


 


 


  (Dulcolax Supp)  10 mg  DAILY  PRN


 RECTAL  4/24/18 23:00


     


 


 


  (Lactulose Liq)  30 ml  DAILY  PRN


 PO  4/24/18 23:00


     


 


 


  (Percocet  5-325


 Mg)  1 tab  Q4H  PRN


 PO  4/24/18 23:00


     


 


 


  (Percocet 


 Mg)  1 tab  Q4H  PRN


 PO  4/24/18 23:00


    4/30/18 12:48


 


 


 Sodium Chloride  1,000 ml @ 


 100 mls/hr  Q10H


 IV  4/25/18 00:45


    4/30/18 01:35


 


 


  (Lactinex Pkt)  1 gm  TID


 PO  4/25/18 18:00


    4/30/18 12:49


 


 


  (Grace-Colace)  1 tab  BID


 PO  4/25/18 21:00


     


 


 


  (Morphine Inj)  4 mg  Q4H  PRN


 IV PUSH  4/25/18 14:30


    4/30/18 09:28


 


 


 Lactated Ringer's  1,000 ml @ 


 100 mls/hr  Q10H


 IV  4/26/18 07:59


    4/26/18 09:20


 


 


 Oxacillin Sodium


 2 gm/Sodium


 Chloride  100 ml @ 


 200 mls/hr  Q4H


 IV  4/27/18 16:00


    4/30/18 07:47


 


 


  (Misc Nursing


 Information)  ALL


 NURSING


 DEPARTME...  [facility]  PRN


 .XX  4/30/18 09:00


 5/1/18 08:59   


 








Family Psych History


No family psychiatric history


Social History


Patient was born and raised in [place], he lives alone in [facility], his single, no 

kids, unemployed, his highest level of education is college degree


Patient's Strengths (min. 2)


No prior psychiatric





Physical Exam


No tremors, no EPS, no stiffness


Vital Signs





Vital Signs








  Date Time  Temp Pulse Resp B/P (MAP) Pulse Ox O2 Delivery O2 Flow Rate FiO2


 


4/30/18 08:56 97.6 98 14 105/67 (80) 99 Room Air  














I/O   


 


 4/30/18 4/30/18 5/1/18





 08:00 16:00 00:00


 


Intake Total  500 ml 


 


Output Total  10 ml 


 


Balance  490 ml 








Lab Results











 Date/Time


Source Procedure


Growth Status


 


 


 4/24/18 21:10


Blood Peripheral Aerobic Blood Culture - Final


NO GROWTH IN 5 DAYS Complete


 


 4/24/18 21:10


Blood Peripheral Anaerobic Blood Culture - Final


NO GROWTH IN 5 DAYS Complete





 4/26/18 07:48


Wound Ankle Fungal Smear - Final


NO FUNGAL ELEMENTS SEEN. Resulted


 


 4/26/18 07:48


Wound Ankle Fungal Culture


Pending Resulted











Mental Status Examination


Appearance:  Appropriate


Consciousness:  Alert


Orientation:  x4


Motor Activity:  Normal gait


Speech:  Unremarkable


Language:  Adequate


Fund of Knowledge:  Adequate


Attention and Concentration:  Adequate


Memory:  Unremarkable


Mood:  Oppositional


Affect:  Irritable


Thought Process & Associations:  Intact


Thought Content:  Appropriate


Hallucination Type:  None


Delusion Type:  None


Suicidal Ideation:  No


Suicidal Plan:  No


Suicidal Intention:  No


Homicidal Ideation:  No


Homicidal Plan:  No


Homicidal Intention:  No


Insight:  Fair


Judgment:  Impulsive





Assessment & Plan


Problem List:  


(1) Adjustment disorder with mixed anxiety and depressed mood


ICD Codes:  F43.23 - Adjustment disorder with mixed anxiety and depressed mood


Assessment & Plan:  On psychiatric evaluation today the patient presents 

irritability, with episodic sadness, anxiety, sense of frustration, poor sleep 

at night mostly related with the length of hospitalization, increased sense of 

abandonment mistreatment by staff lack out of control.  He denies hopelessness, 

he denies helplessness, he denies anhedonia, he denies lack of motivation, he 

denies suicidal enemas ideation, he denies visual and auditory hallucinations.  

She is quite psychologically minded, future oriented, with clear goals of 

getting better and being discharged.  The patient has visible cluster B trait 

the fluid 2 weighs between the antisocial and narcissistic spectrum which might 

make kind of difficult his interaction with staff.  He seems to have poor 

impulse control and coping skills.  He does not benefit of psychiatric 

hospitalization at this moment.  I will start trazodone the 100 mg at bedtime 

and clonazepam 0.5 mg 3 times daily as needed anxiety.  Extensive support, 

psychoeducation and motivation provided.  I will follow-up





Assessment & Plan


Estimated LOS:  Leno Conde MD Apr 30, 2018 13:12

## 2018-04-30 NOTE — HHI.PR
Subjective


Remarks


Patient seen postoperatively.  He reports feeling okay.  Status post repeat 

washout. Wound VAC has been discontinued.





Objective


Vitals





Vital Signs








  Date Time  Temp Pulse Resp B/P (MAP) Pulse Ox O2 Delivery O2 Flow Rate FiO2


 


4/30/18 12:00 97.9 74 18 129/78 (95) 99   


 


4/30/18 08:56 97.6 98 14 105/67 (80) 99 Room Air  


 


4/30/18 08:45  100 13 109/68 (82) 99 Room Air  


 


4/30/18 08:37 97.6 92 15 119/81 (94) 99 Room Air  


 


4/30/18 08:00 97.4 68 18 127/73 (91) 100   


 


4/30/18 00:00 97.6 59 16 122/72 (89) 98   


 


4/29/18 20:00 98.1 63 16 129/81 (97) 99   














I/O      


 


 4/29/18 4/29/18 4/29/18 4/30/18 4/30/18 4/30/18





 06:59 14:59 22:59 06:59 14:59 22:59


 


Intake Total 100 ml    500 ml 


 


Output Total  2220 ml 450 ml  10 ml 


 


Balance 100 ml -2220 ml -450 ml  490 ml 


 


      


 


IV Total 100 ml    0 ml 


 


Other     500 ml 


 


Output Urine Total  2220 ml 450 ml   


 


Estimated Blood Loss     10 ml 


 


# Voids   1   


 


# Bowel Movements  0    








Result Diagram:  


4/27/18 0617





Objective Remarks


GENERAL: This is a well-nourished, well-developed patient, in no apparent 

distress.


CARDIOVASCULAR: Normal rate and regular rhythm without murmurs, gallops, or 

rubs. 


RESPIRATORY: Good respiratory efforts. Breath sounds equal and clear to 

auscultation bilaterally.


GASTROINTESTINAL: Abdomen soft, non-tender, non-distended. Normal active bowel 

sounds


MUSCULOSKELETAL: Right lower extremity dressing appear intact.  Neurovascularly 

intact distally


NEURO:  Alert & Oriented x4 to person, place, time, situation.  Moves all ext x4


PSYCH: Appropriate mood and affect.


Procedures


Irrigation and debridement of right ankle and fibula, application of wound VAC 

dressing





A/P


Assessment and Plan


43-year-old male with:








Right Distal Tibia/Fibula Fx s/p ORIF with wound dehiscence and infection:


Orthopedic surgery consult appreciated. Wound culture growing staph.  S/p 

irrigation and debridement of right ankle and fibula, application of wound VAC 

dressing 4/26. 


- pain control with a bowel regimen.


- rehab efforts.


- IS.


-Infectious disease following. Cultures growing staph aureus. Switched to 

Oxacillin. Will need about 4 weeks of IV antibiotics.  Patient is aware. 

Advised him to continue with the recommended treatment. 


-Status post repeat debridement.  Wound VAC discontinued.





Anemia


Mild.


- follow CBC.





PPx: When ok with surgery











Cassy Barakat MD Apr 30, 2018 14:32

## 2018-04-30 NOTE — PD.OP
cc:   Davy Bains MD


__________________________________________________





Operative Report


Date of Surgery:  Apr 30, 2018


Preoperative Diagnosis:  


Open wound right ankle


Postoperative Diagnosis:  


Procedure:


Irrigation and debridement right ankle, fibula, and tibia


Closure of right ankle open wound


Anesthesia:


General


Surgeon:


Davy Bains


Assistant(s):


ZION White PA-C


 


The surgical procedure was assisted by my physician assistant. My P.A. presence 

was necessary throughout this case for the manipulation and positioning of the 

surgical extremity. My P.A. was assisting me throughout the duration of this 

procedure. The skill set of a physician assistant was medically necessary to 

complete this procedure. During the surgical case the surgical tech was working 

at the back table and the physician assistant was directly assisting me.


Operation and Findings:


Contreras returned to the operating today for treatment of right ankle wound.  

Informed consent was obtained preoperatively Naprosyn was marked.  He is 

brought operating room.  He was given IV sedation and general anesthesia.  

Timeout procedure was performed.  Patient is on scheduled antibiotics.  Right 

leg was prepped with alcohol followed by Hibiclens and draped in usual sterile 

fashion.





Procedure began with debridement of the wound.  An excisional debridement was 

performed.  Skin subcu tissue fascia and bone were sharply debrided with 

curettes and rongoure.  The fibula was debrided with curettes.  Overall the 

wound was clean.  There was good granulation tissue forming.





Next attention was turned towards to wound closure.  The skin edges were 

elevated using a scalpel.  The skin edges were mobilized.  Skin was now closed 

with 2-0 nylon and 3-0 nylon.  A combination of retention sutures and vertical 

mattress sutures were utilized.  The wound was completely closed with minimal 

skin tension.  Sterile dressings were applied.  Patient was awakened and 

transferred to recovery room in stable condition.











Davy Bains MD Apr 30, 2018 08:35

## 2018-05-01 VITALS
RESPIRATION RATE: 20 BRPM | OXYGEN SATURATION: 98 % | DIASTOLIC BLOOD PRESSURE: 60 MMHG | TEMPERATURE: 97.8 F | SYSTOLIC BLOOD PRESSURE: 124 MMHG | HEART RATE: 78 BPM

## 2018-05-01 VITALS
DIASTOLIC BLOOD PRESSURE: 67 MMHG | HEART RATE: 80 BPM | TEMPERATURE: 98 F | OXYGEN SATURATION: 99 % | RESPIRATION RATE: 19 BRPM | SYSTOLIC BLOOD PRESSURE: 120 MMHG

## 2018-05-01 VITALS
TEMPERATURE: 97.6 F | HEART RATE: 60 BPM | OXYGEN SATURATION: 100 % | DIASTOLIC BLOOD PRESSURE: 71 MMHG | RESPIRATION RATE: 20 BRPM | SYSTOLIC BLOOD PRESSURE: 133 MMHG

## 2018-05-01 VITALS
DIASTOLIC BLOOD PRESSURE: 85 MMHG | HEART RATE: 74 BPM | SYSTOLIC BLOOD PRESSURE: 132 MMHG | OXYGEN SATURATION: 99 % | RESPIRATION RATE: 16 BRPM | TEMPERATURE: 98.3 F

## 2018-05-01 VITALS
RESPIRATION RATE: 18 BRPM | OXYGEN SATURATION: 98 % | DIASTOLIC BLOOD PRESSURE: 76 MMHG | SYSTOLIC BLOOD PRESSURE: 132 MMHG | HEART RATE: 66 BPM | TEMPERATURE: 98.3 F

## 2018-05-01 VITALS
DIASTOLIC BLOOD PRESSURE: 64 MMHG | SYSTOLIC BLOOD PRESSURE: 124 MMHG | OXYGEN SATURATION: 99 % | RESPIRATION RATE: 20 BRPM | HEART RATE: 68 BPM | TEMPERATURE: 97.3 F

## 2018-05-01 RX ADMIN — OXYCODONE HYDROCHLORIDE AND ACETAMINOPHEN PRN TAB: 10; 325 TABLET ORAL at 03:30

## 2018-05-01 RX ADMIN — STANDARDIZED SENNA CONCENTRATE AND DOCUSATE SODIUM SCH TAB: 8.6; 5 TABLET, FILM COATED ORAL at 10:03

## 2018-05-01 RX ADMIN — OXACILLIN SODIUM SCH MLS/HR: 2 INJECTION, POWDER, FOR SOLUTION INTRAMUSCULAR; INTRAVENOUS at 03:29

## 2018-05-01 RX ADMIN — LACTOBACILLUS ACIDOPHILUS / LACTOBACILLUS BULGARICUS SCH GM: 100 MILLION CFU STRENGTH GRANULES at 16:52

## 2018-05-01 RX ADMIN — OXACILLIN SODIUM SCH MLS/HR: 2 INJECTION, POWDER, FOR SOLUTION INTRAMUSCULAR; INTRAVENOUS at 20:37

## 2018-05-01 RX ADMIN — LACTOBACILLUS ACIDOPHILUS / LACTOBACILLUS BULGARICUS SCH GM: 100 MILLION CFU STRENGTH GRANULES at 13:00

## 2018-05-01 RX ADMIN — PHENYTOIN SODIUM SCH MLS/HR: 50 INJECTION INTRAMUSCULAR; INTRAVENOUS at 20:37

## 2018-05-01 RX ADMIN — OXYTOCIN SCH MLS/HR: 10 INJECTION, SOLUTION INTRAMUSCULAR; INTRAVENOUS at 07:59

## 2018-05-01 RX ADMIN — LACTOBACILLUS ACIDOPHILUS / LACTOBACILLUS BULGARICUS SCH GM: 100 MILLION CFU STRENGTH GRANULES at 10:02

## 2018-05-01 RX ADMIN — PHENYTOIN SODIUM SCH MLS/HR: 50 INJECTION INTRAMUSCULAR; INTRAVENOUS at 10:09

## 2018-05-01 RX ADMIN — Medication SCH ML: at 20:39

## 2018-05-01 RX ADMIN — OXACILLIN SODIUM SCH MLS/HR: 2 INJECTION, POWDER, FOR SOLUTION INTRAMUSCULAR; INTRAVENOUS at 13:13

## 2018-05-01 RX ADMIN — STANDARDIZED SENNA CONCENTRATE AND DOCUSATE SODIUM SCH TAB: 8.6; 5 TABLET, FILM COATED ORAL at 20:39

## 2018-05-01 RX ADMIN — IBUPROFEN SCH MG: 800 TABLET, FILM COATED ORAL at 22:00

## 2018-05-01 RX ADMIN — OXACILLIN SODIUM SCH MLS/HR: 2 INJECTION, POWDER, FOR SOLUTION INTRAMUSCULAR; INTRAVENOUS at 02:00

## 2018-05-01 RX ADMIN — OXACILLIN SODIUM SCH MLS/HR: 2 INJECTION, POWDER, FOR SOLUTION INTRAMUSCULAR; INTRAVENOUS at 10:02

## 2018-05-01 RX ADMIN — IBUPROFEN SCH MG: 800 TABLET, FILM COATED ORAL at 15:24

## 2018-05-01 RX ADMIN — OXACILLIN SODIUM SCH MLS/HR: 2 INJECTION, POWDER, FOR SOLUTION INTRAMUSCULAR; INTRAVENOUS at 16:51

## 2018-05-01 RX ADMIN — OXYTOCIN SCH MLS/HR: 10 INJECTION, SOLUTION INTRAMUSCULAR; INTRAVENOUS at 17:59

## 2018-05-01 NOTE — HHI.PR
Subjective


Remarks


We had a long discussion about the story leading up to his right ankle 

infection.  He explained that he is part of a rare [employer] again which requires 

members to attend certain meetings otherwise consequences could be serious.  He 

has a meeting that he may not be able to get out of that as an [place], [place] this weekend.  His right would leave on Thursday.  I explained that his 

infection is serious and that I would rather write him for oral antibiotics and 

to have him leave AMA with no antibiotics at all.





Objective


Vitals





Vital Signs








  Date Time  Temp Pulse Resp B/P (MAP) Pulse Ox O2 Delivery O2 Flow Rate FiO2


 


5/1/18 12:00 97.8 78 20 124/60 (81) 98   


 


5/1/18 08:00 97.6 60 20 133/71 (91) 100   


 


5/1/18 03:30 98.3 74 16 132/85 (101) 99   


 


5/1/18 00:00 98.0 80 19 120/67 (84) 99   


 


4/30/18 21:45 97.9 79 18 117/69 (85) 100   


 


4/30/18 16:25 98.0 78 20 111/62 (78) 100   














I/O      


 


 4/30/18 4/30/18 4/30/18 5/1/18 5/1/18 5/1/18





 07:00 15:00 23:00 07:00 15:00 23:00


 


Intake Total  500 ml  1050 ml  


 


Output Total  10 ml  1200 ml  


 


Balance  490 ml  -150 ml  


 


      


 


Intake Oral    1050 ml  


 


IV Total  0 ml    


 


Other  500 ml    


 


Output Urine Total    1200 ml  


 


Estimated Blood Loss  10 ml    


 


# Voids    4  


 


# Bowel Movements   1 0  








Result Diagram:  


4/27/18 0617





Objective Remarks


GENERAL: Well-nourished, well-developed patient.


SKIN: Warm and dry.


HEAD: Normocephalic.


EYES: No scleral icterus. No injection or drainage. 


NECK: Supple, trachea midline. No JVD or lymphadenopathy.


CARDIOVASCULAR: Regular rate and rhythm without murmurs, gallops, or rubs. 


RESPIRATORY: Breath sounds equal bilaterally. No accessory muscle use.


GASTROINTESTINAL: Abdomen soft, non-tender, nondistended. 


EXTREMITIES: Right lower extremity, ankle in a soft splint following surgery 

toes appear somewhat swollen and mottled


NEUROLOGICAL: Awake, alert, and oriented x 3. Non-focal.


Procedures


Irrigation and debridement of right ankle and fibula, application of wound VAC 

dressing





A/P


Problem List:  


(1) Wound of right ankle


ICD Code:  S91.001A - Unspecified open wound, right ankle, initial encounter


Status:  Acute


Assessment and Plan











Surgical wound dehiscence and infection


s/p Right Distal Tibia/Fibula Fx w/ ORIF


Patient has a history of poor compliance and self treatment


Culture thus far is growing staph, final sensitivities are pending


Wound VAC dressing applied April 26


Wound was closed following irrigation and debridement on 4/30/2018 by 

orthopedics


Appreciate orthopedics consultation


Appreciate infectious disease





h/o Poor compliance w/ treatment


Patient has a lot of social issues going on outside of the hospital


He states he may feel compelled to leave the hospital early due to multiple 

obligations


He understands the infection could threaten his life


I have requested that he informed me prior to leaving A so that I can provide 

him antibiotics if he chooses to leave despite our best efforts to give him 

maximum treatment





DVT prophylaxis


SCDs





Problem Qualifiers





(1) Wound of right ankle:  


Qualified Codes:  S91.001D - Unspecified open wound, right ankle, subsequent 

encounter








Gilson Ambrose MD May 1, 2018 14:18

## 2018-05-01 NOTE — PD.ORT.PN
Subjective


Subjective Remarks


POD 1 s/p I&D with wound closure right ankle


doing well pain controlled. no changes.





Objective


Vitals





Vital Signs








  Date Time  Temp Pulse Resp B/P (MAP) Pulse Ox O2 Delivery O2 Flow Rate FiO2


 


5/1/18 00:00 98.0 80 19 120/67 (84) 99   


 


4/30/18 21:45 97.9 79 18 117/69 (85) 100   


 


4/30/18 16:25 98.0 78 20 111/62 (78) 100   


 


4/30/18 12:00 97.9 74 18 129/78 (95) 99   


 


4/30/18 08:56 97.6 98 14 105/67 (80) 99 Room Air  


 


4/30/18 08:45  100 13 109/68 (82) 99 Room Air  


 


4/30/18 08:37 97.6 92 15 119/81 (94) 99 Room Air  


 


4/30/18 08:00 97.4 68 18 127/73 (91) 100   














I/O      


 


 4/30/18 4/30/18 4/30/18 5/1/18 5/1/18 5/1/18





 07:00 15:00 23:00 07:00 15:00 23:00


 


Intake Total  500 ml    


 


Output Total  10 ml    


 


Balance  490 ml    


 


      


 


IV Total  0 ml    


 


Other  500 ml    


 


Estimated Blood Loss  10 ml    


 


# Voids    4  


 


# Bowel Movements   1   








Result Diagram:  


4/27/18 0617





Objective Remarks


Right lower extremity: Clean dry dressings intact.  nvi





Assessment & Plan


Assessment and Plan


1) Right Distal Tibia/Fibula Fx s/p ORIF with wound dehiscence and infection s/

p I&D and wound closure - POD 1


   -NWB


   -daily dressing changes POD 2


   -Infectious Dz consult for IV Abx mgmt


   -will need PICC line and outpt Abx for approx 6-8 weeks due to exposed 

fibular hardware


   -plan for DC home and Abx arranged outpt once PICC line placed. 


   -will follow up joshua Sanchez or PA in 2 weeks











Jalil Jacobs/[facility] PA May 1, 2018 07:15

## 2018-05-02 VITALS
OXYGEN SATURATION: 98 % | HEART RATE: 83 BPM | SYSTOLIC BLOOD PRESSURE: 115 MMHG | TEMPERATURE: 98.9 F | DIASTOLIC BLOOD PRESSURE: 58 MMHG | RESPIRATION RATE: 12 BRPM

## 2018-05-02 VITALS
DIASTOLIC BLOOD PRESSURE: 69 MMHG | OXYGEN SATURATION: 99 % | SYSTOLIC BLOOD PRESSURE: 118 MMHG | TEMPERATURE: 98 F | RESPIRATION RATE: 18 BRPM | HEART RATE: 67 BPM

## 2018-05-02 VITALS
TEMPERATURE: 97.9 F | OXYGEN SATURATION: 96 % | DIASTOLIC BLOOD PRESSURE: 56 MMHG | RESPIRATION RATE: 20 BRPM | SYSTOLIC BLOOD PRESSURE: 100 MMHG | HEART RATE: 65 BPM

## 2018-05-02 VITALS
HEART RATE: 80 BPM | OXYGEN SATURATION: 98 % | TEMPERATURE: 98 F | RESPIRATION RATE: 20 BRPM | SYSTOLIC BLOOD PRESSURE: 112 MMHG | DIASTOLIC BLOOD PRESSURE: 67 MMHG

## 2018-05-02 VITALS
OXYGEN SATURATION: 98 % | DIASTOLIC BLOOD PRESSURE: 65 MMHG | TEMPERATURE: 97.8 F | RESPIRATION RATE: 20 BRPM | HEART RATE: 86 BPM | SYSTOLIC BLOOD PRESSURE: 105 MMHG

## 2018-05-02 RX ADMIN — STANDARDIZED SENNA CONCENTRATE AND DOCUSATE SODIUM SCH TAB: 8.6; 5 TABLET, FILM COATED ORAL at 21:00

## 2018-05-02 RX ADMIN — OXYTOCIN SCH MLS/HR: 10 INJECTION, SOLUTION INTRAMUSCULAR; INTRAVENOUS at 02:07

## 2018-05-02 RX ADMIN — OXACILLIN SODIUM SCH MLS/HR: 2 INJECTION, POWDER, FOR SOLUTION INTRAMUSCULAR; INTRAVENOUS at 10:03

## 2018-05-02 RX ADMIN — OXACILLIN SODIUM SCH MLS/HR: 2 INJECTION, POWDER, FOR SOLUTION INTRAMUSCULAR; INTRAVENOUS at 12:38

## 2018-05-02 RX ADMIN — LACTOBACILLUS ACIDOPHILUS / LACTOBACILLUS BULGARICUS SCH GM: 100 MILLION CFU STRENGTH GRANULES at 10:02

## 2018-05-02 RX ADMIN — OXACILLIN SODIUM SCH MLS/HR: 2 INJECTION, POWDER, FOR SOLUTION INTRAMUSCULAR; INTRAVENOUS at 06:04

## 2018-05-02 RX ADMIN — IBUPROFEN SCH MG: 800 TABLET, FILM COATED ORAL at 06:05

## 2018-05-02 RX ADMIN — Medication SCH ML: at 21:00

## 2018-05-02 RX ADMIN — PHENYTOIN SODIUM SCH MLS/HR: 50 INJECTION INTRAMUSCULAR; INTRAVENOUS at 22:24

## 2018-05-02 RX ADMIN — OXYTOCIN SCH MLS/HR: 10 INJECTION, SOLUTION INTRAMUSCULAR; INTRAVENOUS at 22:25

## 2018-05-02 RX ADMIN — OXYTOCIN SCH MLS/HR: 10 INJECTION, SOLUTION INTRAMUSCULAR; INTRAVENOUS at 13:59

## 2018-05-02 RX ADMIN — OXACILLIN SODIUM SCH MLS/HR: 2 INJECTION, POWDER, FOR SOLUTION INTRAMUSCULAR; INTRAVENOUS at 00:00

## 2018-05-02 RX ADMIN — IBUPROFEN SCH MG: 800 TABLET, FILM COATED ORAL at 22:55

## 2018-05-02 RX ADMIN — IBUPROFEN SCH MG: 800 TABLET, FILM COATED ORAL at 16:39

## 2018-05-02 RX ADMIN — LACTOBACILLUS ACIDOPHILUS / LACTOBACILLUS BULGARICUS SCH GM: 100 MILLION CFU STRENGTH GRANULES at 12:39

## 2018-05-02 RX ADMIN — PHENYTOIN SODIUM SCH MLS/HR: 50 INJECTION INTRAMUSCULAR; INTRAVENOUS at 02:06

## 2018-05-02 RX ADMIN — OXACILLIN SODIUM SCH MLS/HR: 2 INJECTION, POWDER, FOR SOLUTION INTRAMUSCULAR; INTRAVENOUS at 16:40

## 2018-05-02 RX ADMIN — LACTOBACILLUS ACIDOPHILUS / LACTOBACILLUS BULGARICUS SCH GM: 100 MILLION CFU STRENGTH GRANULES at 16:39

## 2018-05-02 RX ADMIN — OXACILLIN SODIUM SCH MLS/HR: 2 INJECTION, POWDER, FOR SOLUTION INTRAMUSCULAR; INTRAVENOUS at 22:55

## 2018-05-02 RX ADMIN — GABAPENTIN SCH MG: 300 CAPSULE ORAL at 22:52

## 2018-05-02 RX ADMIN — STANDARDIZED SENNA CONCENTRATE AND DOCUSATE SODIUM SCH TAB: 8.6; 5 TABLET, FILM COATED ORAL at 10:03

## 2018-05-02 NOTE — HHI.PR
Subjective


Remarks


Patient states that he is now planning to stay for inpatient treatment of his 

foot infection.  He says he slept well last night, the ibuprofen has helped as 

well as the gabapentin.





Objective


Vitals





Vital Signs








  Date Time  Temp Pulse Resp B/P (MAP) Pulse Ox O2 Delivery O2 Flow Rate FiO2


 


5/2/18 11:53 98.0 80 20 112/67 (82) 98   


 


5/2/18 08:12 97.9 65 20 100/56 (71) 96   


 


5/2/18 00:00 98.0 67 18 118/69 (85) 99   


 


5/1/18 20:00 98.3 66 18 132/76 (94) 98   


 


5/1/18 16:00 97.3 68 20 124/64 (84) 99   














I/O      


 


 5/1/18 5/1/18 5/1/18 5/2/18 5/2/18 5/2/18





 07:00 15:00 23:00 07:00 15:00 23:00


 


Intake Total 1050 ml 480 ml    


 


Output Total 1200 ml  1200 ml 950 ml  


 


Balance -150 ml 480 ml -1200 ml -950 ml  


 


      


 


Intake Oral 1050 ml 480 ml    


 


Output Urine Total 1200 ml  1200 ml 950 ml  


 


# Voids 4     


 


# Bowel Movements 0  0 0  








Objective Remarks


GENERAL: Well-nourished, well-developed patient.


SKIN: Warm and dry.


HEAD: Normocephalic.


EYES: No scleral icterus. No injection or drainage. 


NECK: Supple, trachea midline. No JVD or lymphadenopathy.


CARDIOVASCULAR: Regular rate and rhythm without murmurs, gallops, or rubs. 


RESPIRATORY: Breath sounds equal bilaterally. No accessory muscle use.


GASTROINTESTINAL: Abdomen soft, non-tender, nondistended. 


EXTREMITIES: Right lower extremity, ankle in a soft splint following surgery 

toes appear somewhat swollen and mottled


NEUROLOGICAL: Awake, alert, and oriented x 3. Non-focal.


Procedures


Irrigation and debridement of right ankle and fibula, application of wound VAC 

dressing





A/P


Problem List:  


(1) Wound of right ankle


ICD Code:  S91.001A - Unspecified open wound, right ankle, initial encounter


Status:  Acute


Assessment and Plan











Surgical wound dehiscence and infection


s/p Right Distal Tibia/Fibula Fx w/ ORIF


Patient has a history of poor compliance and self treatment


Culture thus far is growing staph, final sensitivities are pending


Wound VAC dressing applied April 26


Wound was closed following irrigation and debridement on 4/30/2018 by 

orthopedics


Appreciate orthopedics consultation


Appreciate infectious disease





Neuropathic pain


Continue gabapentin for prevention of reflex sympathetic dystrophy


Ibuprofen added for anti-inflammatory effect


Continue oxycodone as needed, morphine IV for breakthrough





h/o Poor compliance w/ treatment


Patient has a lot of social issues going on outside of the hospital


Patient previously stated he may have to leave the hospital early, he has 

changed his story and says will be allowed to stay in the hospital





DVT prophylaxis


SCDs





Problem Qualifiers





(1) Wound of right ankle:  


Qualified Codes:  S91.001D - Unspecified open wound, right ankle, subsequent 

encounter








Gilson Ambrose MD May 2, 2018 15:30

## 2018-05-03 VITALS
TEMPERATURE: 97.7 F | OXYGEN SATURATION: 100 % | SYSTOLIC BLOOD PRESSURE: 121 MMHG | HEART RATE: 72 BPM | DIASTOLIC BLOOD PRESSURE: 69 MMHG | RESPIRATION RATE: 18 BRPM

## 2018-05-03 VITALS
HEART RATE: 69 BPM | SYSTOLIC BLOOD PRESSURE: 120 MMHG | RESPIRATION RATE: 18 BRPM | DIASTOLIC BLOOD PRESSURE: 69 MMHG | OXYGEN SATURATION: 99 % | TEMPERATURE: 97.9 F

## 2018-05-03 VITALS
DIASTOLIC BLOOD PRESSURE: 84 MMHG | SYSTOLIC BLOOD PRESSURE: 134 MMHG | TEMPERATURE: 97.8 F | HEART RATE: 81 BPM | OXYGEN SATURATION: 95 % | RESPIRATION RATE: 18 BRPM

## 2018-05-03 VITALS
SYSTOLIC BLOOD PRESSURE: 152 MMHG | RESPIRATION RATE: 22 BRPM | DIASTOLIC BLOOD PRESSURE: 76 MMHG | OXYGEN SATURATION: 97 % | HEART RATE: 75 BPM | TEMPERATURE: 97.2 F

## 2018-05-03 VITALS
SYSTOLIC BLOOD PRESSURE: 130 MMHG | DIASTOLIC BLOOD PRESSURE: 73 MMHG | HEART RATE: 72 BPM | OXYGEN SATURATION: 98 % | RESPIRATION RATE: 18 BRPM | TEMPERATURE: 97.8 F

## 2018-05-03 VITALS
TEMPERATURE: 98 F | DIASTOLIC BLOOD PRESSURE: 76 MMHG | RESPIRATION RATE: 18 BRPM | SYSTOLIC BLOOD PRESSURE: 129 MMHG | OXYGEN SATURATION: 100 % | HEART RATE: 93 BPM

## 2018-05-03 RX ADMIN — PHENYTOIN SODIUM SCH MLS/HR: 50 INJECTION INTRAMUSCULAR; INTRAVENOUS at 16:03

## 2018-05-03 RX ADMIN — STANDARDIZED SENNA CONCENTRATE AND DOCUSATE SODIUM SCH TAB: 8.6; 5 TABLET, FILM COATED ORAL at 21:00

## 2018-05-03 RX ADMIN — STANDARDIZED SENNA CONCENTRATE AND DOCUSATE SODIUM SCH TAB: 8.6; 5 TABLET, FILM COATED ORAL at 07:50

## 2018-05-03 RX ADMIN — LACTOBACILLUS ACIDOPHILUS / LACTOBACILLUS BULGARICUS SCH GM: 100 MILLION CFU STRENGTH GRANULES at 16:03

## 2018-05-03 RX ADMIN — LACTOBACILLUS ACIDOPHILUS / LACTOBACILLUS BULGARICUS SCH GM: 100 MILLION CFU STRENGTH GRANULES at 07:50

## 2018-05-03 RX ADMIN — IBUPROFEN SCH MG: 800 TABLET, FILM COATED ORAL at 06:25

## 2018-05-03 RX ADMIN — OXACILLIN SODIUM SCH MLS/HR: 2 INJECTION, POWDER, FOR SOLUTION INTRAMUSCULAR; INTRAVENOUS at 23:07

## 2018-05-03 RX ADMIN — OXYTOCIN SCH MLS/HR: 10 INJECTION, SOLUTION INTRAMUSCULAR; INTRAVENOUS at 23:04

## 2018-05-03 RX ADMIN — OXACILLIN SODIUM SCH MLS/HR: 2 INJECTION, POWDER, FOR SOLUTION INTRAMUSCULAR; INTRAVENOUS at 11:23

## 2018-05-03 RX ADMIN — OXACILLIN SODIUM SCH MLS/HR: 2 INJECTION, POWDER, FOR SOLUTION INTRAMUSCULAR; INTRAVENOUS at 16:10

## 2018-05-03 RX ADMIN — LACTOBACILLUS ACIDOPHILUS / LACTOBACILLUS BULGARICUS SCH GM: 100 MILLION CFU STRENGTH GRANULES at 11:24

## 2018-05-03 RX ADMIN — Medication SCH ML: at 23:07

## 2018-05-03 RX ADMIN — OXACILLIN SODIUM SCH MLS/HR: 2 INJECTION, POWDER, FOR SOLUTION INTRAMUSCULAR; INTRAVENOUS at 07:50

## 2018-05-03 RX ADMIN — GABAPENTIN SCH MG: 300 CAPSULE ORAL at 23:07

## 2018-05-03 RX ADMIN — IBUPROFEN SCH MG: 800 TABLET, FILM COATED ORAL at 23:15

## 2018-05-03 RX ADMIN — PHENYTOIN SODIUM SCH MLS/HR: 50 INJECTION INTRAMUSCULAR; INTRAVENOUS at 07:50

## 2018-05-03 RX ADMIN — OXYTOCIN SCH MLS/HR: 10 INJECTION, SOLUTION INTRAMUSCULAR; INTRAVENOUS at 07:50

## 2018-05-03 RX ADMIN — OXYTOCIN SCH MLS/HR: 10 INJECTION, SOLUTION INTRAMUSCULAR; INTRAVENOUS at 16:03

## 2018-05-03 RX ADMIN — Medication SCH ML: at 07:50

## 2018-05-03 RX ADMIN — IBUPROFEN SCH MG: 800 TABLET, FILM COATED ORAL at 11:23

## 2018-05-03 RX ADMIN — OXACILLIN SODIUM SCH MLS/HR: 2 INJECTION, POWDER, FOR SOLUTION INTRAMUSCULAR; INTRAVENOUS at 02:29

## 2018-05-03 RX ADMIN — OXACILLIN SODIUM SCH MLS/HR: 2 INJECTION, POWDER, FOR SOLUTION INTRAMUSCULAR; INTRAVENOUS at 06:23

## 2018-05-03 NOTE — HHI.FF
__________________________________________________





Infusion Therapy


Patient Information


Patient Weight


105 kg


Diagnosis:  


Coded Allergies:  


     No Known Allergies (Verified  Allergy, Mild, 4/24/18)





Administer Medication


Oxacillin


12 grams IV


IV continuous infusion over 24 hours.


Stop Treatment:  Jun 7, 2018





Additional Information


Venous access:  PICC Line


Additional Instructions





[x] Peripheral flush and dressing changes per protocol


[x] Implanted port and central :


* Implanted port: 10 ml Normal Saline followed by 5 ml Heparin 100 units/ml 

Heparin flush


   after each use and monthly to maintain.


   [] May leave port accessed during therapy.


   [] May leave peripheral site accessed for duration of therapy.





[x] If patient has SOB or respiratory distress, check oxygen saturation. If 

less than 90% or clinical signs of respiratory distress, administer oxygen at 2 

L/min. via nasal cannula and notify physician.





[x] Anaphylaxis/Reaction orders:


* Stop infusion.


* Keep IV line open with saline flush.


* Notify physician.


* Monitor vital signs every 15 minutes until symptoms resolve.


* Check Oxygen saturation; Oxygen at 2 L/min. via nasal cannula if less than 90%

 or clinical signs of respiratory distress.


* Administer diphenhydramine (Benadryl) 25 mg IV STAT, (unless patient has 

received as pre-med). May repeat once, if necessary.


* Solu-Cortef 250 mg IVP over 30-60 seconds, use 100 mg vials for each 

dissolution.


* Epinephrine (1mg/1 ml) 0.3 mg subcutaneously or IVP now with any signs of 

respiratory distress.


* Check with physician for new additional pre-med orders if patient is re-

challenged or re-treated.


[x] May remove PICC line when treatment complete, after confirming with 

Physician.


[x] If the patient is admitted to the hospital, the ED, or transferred via [facility]

, complete transfer form including medication reconciliation order sheet.





Laboratory Tests


Weekly Labs:  BMP, CBC w/diff


Additional Information


Fax abnormal labs to Dr Saeed Fax number [phone].











Larry Saeed MD May 3, 2018 18:51

## 2018-05-03 NOTE — HHI.IDPN
Note


Infectious Disease Note











Patient states that he feels much better.  


He states that the pain in his right leg is improved.


He denies chills.


Afebrile.


Intraoperative culture staph aureus.





Per orthopedics, the fibula hardware was exposed.





Post I&D of the right ankle, tibia and fibula.


 





 


PAST MEDICAL HISTORY:


1.  Herniated disk.


2.  Fracture of the right clavicle.


3.  Right tibia/fibula fracture fixation on February 12, 2018


 


ALLERGIES:


NO KNOWN DRUG ALLERGIES.


 


MEDICATIONS:


1.  Vancomycin


2.  Morphine sulfate p.r.n.


3.  Percocet 10 p.r.n.


 


SOCIAL HISTORY:


Positive tobacco use, occasional alcohol use.  Denies substance abuse.


Objective:





Vital Signs








  Date Time  Temp Pulse Resp B/P (MAP) Pulse Ox O2 Delivery O2 Flow Rate FiO2


 


5/3/18 12:32   16     


 


5/3/18 12:00 97.8 72 18 130/73 (92) 98   


 


5/3/18 08:00 97.9 69 18 120/69 (86) 99   


 


5/3/18 00:00 97.7 72 18 121/69 (86) 100   


 


5/2/18 19:25 98.9 83 12 115/58 (77) 98   











 


PHYSICAL EXAMINATION:


GENERAL: No acute distress.  Awake and alert.


HEENT:  The head is atraumatic.  Extraocular movements are grossly 


intact.  Pupils reactive to light.  No icterus.  Oropharynx:  No 


lesions.


NECK:  Supple without adenopathy or swelling.


LUNGS:  Clear breath sounds bilaterally.


HEART:  Regular S1 and S2, without murmurs, rubs or gallops.


ABDOMEN:  Bowel sounds present.  Soft, nontender.


EXTREMITIES:  The right leg has surgical dressing in place post skin closure.


The wound is clean.


SKIN:  No rash.


NEUROLOGIC:  No gross focal finding.


PSYCHIATRIC: Calm and cooperative.


 





 


IMPRESSION:


Wound infection of the right ankle.  Staph aureus methicillin sensitive.  


The patient is status post open reduction and internal fixation of right tibia/

fibula fracture.


 


RECOMMENDATIONS:


Continue the Oxacillin intravenous until June 7, 2018.  Orders written.


PICC line for antibiotic administration.


Case management to arrange for antibiotics for outpatient.











Larry Saeed MD May 3, 2018 18:43

## 2018-05-03 NOTE — HHI.PR
Subjective


Remarks


43-year-old male with infection of surgical site for right tib-fib fracture.  

He is in a good mood today, states he has been sleeping well.  He would like to 

know when he is going home.  We discussed the option of outpatient infusion 

clinic.





Objective


Vitals





Vital Signs








  Date Time  Temp Pulse Resp B/P (MAP) Pulse Ox O2 Delivery O2 Flow Rate FiO2


 


5/3/18 12:32   16     


 


5/3/18 12:00 97.8 72 18 130/73 (92) 98   


 


5/3/18 08:00 97.9 69 18 120/69 (86) 99   


 


5/3/18 00:00 97.7 72 18 121/69 (86) 100   


 


5/2/18 19:25 98.9 83 12 115/58 (77) 98   














I/O      


 


 5/2/18 5/2/18 5/2/18 5/3/18 5/3/18 5/3/18





 07:00 15:00 23:00 07:00 15:00 23:00


 


Output Total 950 ml     


 


Balance -950 ml     


 


      


 


Output Urine Total 950 ml     


 


# Voids    2  


 


# Bowel Movements 0     








Objective Remarks


GENERAL: Well-nourished, well-developed patient.


SKIN: Warm and dry.


HEAD: Normocephalic.


EYES: No scleral icterus. No injection or drainage. 


NECK: Supple, trachea midline. No JVD or lymphadenopathy.


CARDIOVASCULAR: Regular rate and rhythm without murmurs, gallops, or rubs. 


RESPIRATORY: Breath sounds equal bilaterally. No accessory muscle use.


GASTROINTESTINAL: Abdomen soft, non-tender, nondistended. 


EXTREMITIES: Right lower extremity, ankle is less swollen, minimal erythema, 

wounds appear uninfected at this time and surfaces physiologically closed by 

soft scabs


NEUROLOGICAL: Awake, alert, and oriented x 3. Non-focal.


Procedures


Irrigation and debridement of right ankle and fibula, application of wound VAC 

dressing





A/P


Problem List:  


(1) Wound of right ankle


ICD Code:  S91.001A - Unspecified open wound, right ankle, initial encounter


Status:  Acute


Assessment and Plan











Surgical wound dehiscence and infection


s/p Right Distal Tibia/Fibula Fx w/ ORIF


Patient has a history of poor compliance and self treatment


Culture thus far is growing staph, final sensitivities for April 26 collection 

are pending


Wound VAC dressing applied April 26


Wound was closed following irrigation and debridement on 4/30/2018 by 

orthopedics


Appreciate orthopedics consultation


Appreciate infectious disease





Neuropathic pain


Continue gabapentin for prevention of reflex sympathetic dystrophy


Continue oxycodone as needed, morphine IV for breakthrough


Continue ibuprofen for anti-inflammation effect





h/o Poor compliance w/ treatment


Patient has a lot of social issues going on outside of the hospital


Patient states he was excused from his obligations and [place], he will be 

staying here for treatment





DVT prophylaxis


SCDs





Discharge planning


Infectious disease reconsulted to assist with infusion clinic orders





Problem Qualifiers





(1) Wound of right ankle:  


Qualified Codes:  S91.001D - Unspecified open wound, right ankle, subsequent 

encounter








Gilson Ambrose MD May 3, 2018 16:28

## 2018-05-04 VITALS
OXYGEN SATURATION: 100 % | SYSTOLIC BLOOD PRESSURE: 140 MMHG | TEMPERATURE: 97.7 F | DIASTOLIC BLOOD PRESSURE: 80 MMHG | HEART RATE: 86 BPM | RESPIRATION RATE: 18 BRPM

## 2018-05-04 VITALS
SYSTOLIC BLOOD PRESSURE: 134 MMHG | OXYGEN SATURATION: 100 % | RESPIRATION RATE: 18 BRPM | HEART RATE: 88 BPM | DIASTOLIC BLOOD PRESSURE: 74 MMHG | TEMPERATURE: 97.9 F

## 2018-05-04 LAB
BUN SERPL-MCNC: 9 MG/DL (ref 7–18)
CALCIUM SERPL-MCNC: 9.3 MG/DL (ref 8.5–10.1)
CHLORIDE SERPL-SCNC: 105 MEQ/L (ref 98–107)
CREAT SERPL-MCNC: 0.9 MG/DL (ref 0.6–1.3)
ERYTHROCYTE [DISTWIDTH] IN BLOOD BY AUTOMATED COUNT: 14.2 % (ref 11.6–17.2)
GFR SERPLBLD BASED ON 1.73 SQ M-ARVRAT: 92 ML/MIN (ref 89–?)
GLUCOSE SERPL-MCNC: 85 MG/DL (ref 74–106)
HCO3 BLD-SCNC: 25.7 MEQ/L (ref 21–32)
HCT VFR BLD CALC: 41.3 % (ref 39–51)
HGB BLD-MCNC: 13.9 GM/DL (ref 13–17)
MCH RBC QN AUTO: 29.8 PG (ref 27–34)
MCHC RBC AUTO-ENTMCNC: 33.6 % (ref 32–36)
MCV RBC AUTO: 88.8 FL (ref 80–100)
PLATELET # BLD: 280 TH/MM3 (ref 150–450)
PMV BLD AUTO: 8.3 FL (ref 7–11)
RBC # BLD AUTO: 4.65 MIL/MM3 (ref 4.5–5.9)
SODIUM SERPL-SCNC: 141 MEQ/L (ref 136–145)
WBC # BLD AUTO: 8.4 TH/MM3 (ref 4–11)

## 2018-05-04 RX ADMIN — OXYTOCIN SCH MLS/HR: 10 INJECTION, SOLUTION INTRAMUSCULAR; INTRAVENOUS at 12:28

## 2018-05-04 RX ADMIN — IBUPROFEN SCH MG: 800 TABLET, FILM COATED ORAL at 05:32

## 2018-05-04 RX ADMIN — OXACILLIN SODIUM SCH MLS/HR: 2 INJECTION, POWDER, FOR SOLUTION INTRAMUSCULAR; INTRAVENOUS at 07:40

## 2018-05-04 RX ADMIN — LACTOBACILLUS ACIDOPHILUS / LACTOBACILLUS BULGARICUS SCH GM: 100 MILLION CFU STRENGTH GRANULES at 07:40

## 2018-05-04 RX ADMIN — OXACILLIN SODIUM SCH MLS/HR: 2 INJECTION, POWDER, FOR SOLUTION INTRAMUSCULAR; INTRAVENOUS at 05:29

## 2018-05-04 RX ADMIN — PHENYTOIN SODIUM SCH MLS/HR: 50 INJECTION INTRAMUSCULAR; INTRAVENOUS at 12:28

## 2018-05-04 RX ADMIN — PHENYTOIN SODIUM SCH MLS/HR: 50 INJECTION INTRAMUSCULAR; INTRAVENOUS at 04:45

## 2018-05-04 RX ADMIN — OXACILLIN SODIUM SCH MLS/HR: 2 INJECTION, POWDER, FOR SOLUTION INTRAMUSCULAR; INTRAVENOUS at 01:50

## 2018-05-04 RX ADMIN — Medication SCH ML: at 07:40

## 2018-05-04 RX ADMIN — STANDARDIZED SENNA CONCENTRATE AND DOCUSATE SODIUM SCH TAB: 8.6; 5 TABLET, FILM COATED ORAL at 07:40

## 2018-05-04 NOTE — PD.ORT.PN
Subjective


Subjective Remarks


POD 4 s/p I&D with wound closure right ankle


doing well pain controlled. no changes. scheduled for picc line placement today





Objective


Vitals





Vital Signs








  Date Time  Temp Pulse Resp B/P (MAP) Pulse Ox O2 Delivery O2 Flow Rate FiO2


 


5/4/18 04:00 97.9 88 18 134/74 (94) 100   


 


5/3/18 23:00 98.0 93 18 129/76 (93) 100   


 


5/3/18 21:03 97.8 81 18 134/84 (101) 95   


 


5/3/18 12:32   16     


 


5/3/18 12:00 97.8 72 18 130/73 (92) 98   


 


5/3/18 08:00 97.9 69 18 120/69 (86) 99   














I/O      


 


 5/3/18 5/3/18 5/3/18 5/4/18 5/4/18 5/4/18





 07:00 15:00 23:00 07:00 15:00 23:00


 


      


 


# Voids 2   5  








Result Diagram:  


5/4/18 0435                                                                    

            5/4/18 0435





Objective Remarks


Right lower extremity: Clean dry dressings intact.  nvi. bandages removed. 

wound visualized. healing well. 2 small areas of slight dehissence. no 

drainage. medial wound with solid granulation tissue and skin edges healing





Assessment & Plan


Assessment and Plan


1) Right Distal Tibia/Fibula Fx s/p ORIF with wound dehiscence and infection s/

p I&D and wound closure - POD 4


   -NWB


   -daily dressing changes 


   -Infectious Dz consult for IV Abx mgmt


   -will need PICC line and outpt Abx for approx 6-8 weeks due to exposed 

fibular hardware


   -plan for DC home and Abx arranged outpt once PICC line placed. 


   -will follow up joshua Sanchez or PA in 2 weeks











Jalil Jacobs/First Assist PA May 4, 2018 07:52

## 2018-05-04 NOTE — HHI.DS
__________________________________________________





Discharge Summary


Admission Date


Apr 25, 2018 at 14:11


Discharge Date:  May 4, 2018


Admitting Diagnosis





Right ankle wound





(1) Wound of right ankle


ICD Code:  S91.001A - Unspecified open wound, right ankle, initial encounter


Status:  Acute


Procedures


Irrigation and debridement of right ankle and fibula, application of wound VAC 

dressing


Brief History - From Admission


43-year-old male status post ORIF of the right tibia/fibula on 3/7/18 with Dr. Hirsch complicated by wound dehiscence presents to the emergency department 

from Dr. Hirsch's office today for further treatment of his wound dehiscence.  

The patient reports severe pain in his right lateral ankle.  He denies any 

chest pain or shortness of breath.  No abdominal pain.  No nausea/vomiting/

diarrhea.  No fatigue/weakness.  No fevers/chills.


CBC/BMP:  


5/4/18 0435                                                                    

            5/4/18 0435





Significant Findings





Laboratory Tests








Test


  5/4/18


04:35








PE at Discharge


GENERAL: Well-nourished, well-developed patient.


SKIN: Warm and dry.


HEAD: Normocephalic.


EYES: No scleral icterus. No injection or drainage. 


NECK: Supple, trachea midline. No JVD or lymphadenopathy.


CARDIOVASCULAR: Regular rate and rhythm without murmurs, gallops, or rubs. 


RESPIRATORY: Breath sounds equal bilaterally. No accessory muscle use.


GASTROINTESTINAL: Abdomen soft, non-tender, nondistended. 


EXTREMITIES: Right lower extremity, ankle is less swollen, minimal erythema, 

wounds appear uninfected at this time and surfaces physiologically closed by 

soft scabs


NEUROLOGICAL: Awake, alert, and oriented x 3. Non-focal.


Hospital Course


43-year-old male with a complex fracture of his tib-fib last February repaired 

by Dr. Hirsch.  Patient has had a complex recovery related to poor follow-up 

and self treatment as well as simple chance.  He developed an infection of his 

bilateral right ankle surgical wounds and had wound dehiscence.  The wound 

failed to close with wound VAC.  He attempted treatment at home but the ankle 

became too swollen to manage and after follow-up with his orthopedist 

outpatient he was sent to the ER for antibiotic treatment.  He has been 

receiving oxacillin 4 times a day IV.  She underwent irrigation and debridement 

with wound closure with orthopedics during his stay.  The foot is much less 

swollen less red.  Circulation appears healthy and patient is interested in 

converting his IV therapy to outpatient.  He will be receiving oxacillin over a 

24 hour infusion daily until June 7 according to infectious disease 

recommendations.  Recommended he follow-up with his orthopedic surgeon during 

this timeframe.


Pt Condition on Discharge:  Good


Discharge Disposition:  Discharge Home


Discharge Time:  <= 30 minutes


Discharge Instructions


DIET: Follow Instructions for:  As Tolerated, No Restrictions


Activities you can perform:  Partial Weight Bearing











Gilson Ambrose MD May 4, 2018 08:52

## 2018-05-29 ENCOUNTER — HOSPITAL ENCOUNTER (EMERGENCY)
Dept: HOSPITAL 17 - NEPC | Age: 43
Discharge: HOME | End: 2018-05-29
Payer: COMMERCIAL

## 2018-05-29 VITALS
DIASTOLIC BLOOD PRESSURE: 65 MMHG | OXYGEN SATURATION: 98 % | SYSTOLIC BLOOD PRESSURE: 122 MMHG | HEART RATE: 72 BPM | RESPIRATION RATE: 18 BRPM | TEMPERATURE: 97.4 F

## 2018-05-29 VITALS — BODY MASS INDEX: 25.51 KG/M2 | WEIGHT: 220.46 LBS | HEIGHT: 78 IN

## 2018-05-29 DIAGNOSIS — T81.30XA: Primary | ICD-10-CM

## 2018-05-29 DIAGNOSIS — F17.200: ICD-10-CM

## 2018-05-29 LAB
BASOPHILS # BLD AUTO: 0.1 TH/MM3 (ref 0–0.2)
BASOPHILS NFR BLD: 1.4 % (ref 0–2)
BUN SERPL-MCNC: 9 MG/DL (ref 7–18)
CALCIUM SERPL-MCNC: 8.9 MG/DL (ref 8.5–10.1)
CHLORIDE SERPL-SCNC: 104 MEQ/L (ref 98–107)
CREAT SERPL-MCNC: 0.93 MG/DL (ref 0.6–1.3)
EOSINOPHIL # BLD: 0.2 TH/MM3 (ref 0–0.4)
EOSINOPHIL NFR BLD: 3 % (ref 0–4)
ERYTHROCYTE [DISTWIDTH] IN BLOOD BY AUTOMATED COUNT: 14.9 % (ref 11.6–17.2)
GFR SERPLBLD BASED ON 1.73 SQ M-ARVRAT: 89 ML/MIN (ref 89–?)
GLUCOSE SERPL-MCNC: 94 MG/DL (ref 74–106)
HCO3 BLD-SCNC: 29 MEQ/L (ref 21–32)
HCT VFR BLD CALC: 42.5 % (ref 39–51)
HGB BLD-MCNC: 14.2 GM/DL (ref 13–17)
LYMPHOCYTES # BLD AUTO: 2.3 TH/MM3 (ref 1–4.8)
LYMPHOCYTES NFR BLD AUTO: 32.1 % (ref 9–44)
MCH RBC QN AUTO: 29.6 PG (ref 27–34)
MCHC RBC AUTO-ENTMCNC: 33.5 % (ref 32–36)
MCV RBC AUTO: 88.5 FL (ref 80–100)
MONOCYTE #: 0.8 TH/MM3 (ref 0–0.9)
MONOCYTES NFR BLD: 11.9 % (ref 0–8)
NEUTROPHILS # BLD AUTO: 3.6 TH/MM3 (ref 1.8–7.7)
NEUTROPHILS NFR BLD AUTO: 51.6 % (ref 16–70)
PLATELET # BLD: 256 TH/MM3 (ref 150–450)
PMV BLD AUTO: 8.5 FL (ref 7–11)
RBC # BLD AUTO: 4.8 MIL/MM3 (ref 4.5–5.9)
SODIUM SERPL-SCNC: 140 MEQ/L (ref 136–145)
WBC # BLD AUTO: 7.1 TH/MM3 (ref 4–11)

## 2018-05-29 PROCEDURE — 87077 CULTURE AEROBIC IDENTIFY: CPT

## 2018-05-29 PROCEDURE — 85025 COMPLETE CBC W/AUTO DIFF WBC: CPT

## 2018-05-29 PROCEDURE — 86403 PARTICLE AGGLUT ANTBDY SCRN: CPT

## 2018-05-29 PROCEDURE — 99283 EMERGENCY DEPT VISIT LOW MDM: CPT

## 2018-05-29 PROCEDURE — 80048 BASIC METABOLIC PNL TOTAL CA: CPT

## 2018-05-29 PROCEDURE — 87186 SC STD MICRODIL/AGAR DIL: CPT

## 2018-05-29 PROCEDURE — 87070 CULTURE OTHR SPECIMN AEROBIC: CPT

## 2018-05-29 PROCEDURE — 73590 X-RAY EXAM OF LOWER LEG: CPT

## 2018-05-29 NOTE — PD
HPI


Chief Complaint:  Injury


Time Seen by Provider:  12:53


Travel History


International Travel<30 days:  No


Contact w/Intl Traveler<30days:  No


Traveled to known affect area:  No





History of Present Illness


HPI


43-year-old male presents to the emergency department for evaluation of right 

ankle pain.  Patient had ORIF of the right tibia/fibula on March 7, 2018 by Dr. Hirsch which was then complicated by wound dehiscence.  He was admitted to the 

hospital from April 25, 2018 to May 4, 2018.  He was discharged with a right 

PICC line on oxacillin.  He states that one week ago, he pulled his PICC line 

out and stopped his antibiotics because the "CDC states antibiotics should be 

given for 4 weeks".  According to previous chart, patient was to receive 

antibiotics until June 7.  Patient reports 10/10 right ankle pain without 

radiation.  Pain is worse with ambulation, slightly relieved with keeping the 

ankle still.  He states he recently started walking on his ankle again.  He has 

never followed up with Dr. Hirsch in his office.  Patient reports the wound has 

not changed much since being discharged.  He still has sutures in place.  He is 

not currently on any medications.  Moderate severity.





PFSH


Past Medical History


Arthritis:  Yes (HAND)


Blood Disorders:  No


Cancer:  No


Cardiovascular Problems:  No


Diabetes:  No


Diminished Hearing:  No


Endocrine:  No


Genitourinary:  No


Hepatitis:  No


Hiatal Hernia:  No


Immune Disorder:  No


Implanted Vascular Access Dvce:  Yes


Musculoskeletal:  Yes (RIGHT LEG FRACTURE, RIGHT CLAVICLE FRACTURE)


Neurologic:  No


Psychiatric:  No


Reproductive:  No


Respiratory:  No


Immunizations Current:  Yes


Thyroid Disease:  No





Past Surgical History


AICD:  No


Cardiac Surgery:  No


Ear Surgery:  No


Endocrine Surgery:  No


Eye Surgery:  No


Genitourinary Surgery:  No


Joint Replacement:  No


Oral Surgery:  No


Pacemaker:  No


Thoracic Surgery:  No


Other Surgery:  Yes





Social History


Alcohol Use:  Yes (occasionally)


Tobacco Use:  Yes (1ppd)


Substance Use:  No





Allergies-Medications


(Allergen,Severity, Reaction):  


Coded Allergies:  


     No Known Allergies (Verified  Allergy, Mild, 5/29/18)


Reported Meds & Prescriptions





Reported Meds & Active Scripts


Active








Review of Systems


Except as stated in HPI:  all other systems reviewed are Neg





Physical Exam


Narrative


GENERAL: Well-nourished, well-developed male patient, afebrile.


SKIN: Focused skin assessment warm/dry.  Patient has dehisced incision to right 

lateral ankle with surrounding erythema, mild drainage.


HEAD: Normocephalic.  Atraumatic.


EYES: No scleral icterus. No injection or drainage. 


NECK: Supple, trachea midline. No JVD or lymphadenopathy.


CARDIOVASCULAR: Regular rate and rhythm without murmurs, gallops, or rubs.  

Right pedal pulse is 2+.


RESPIRATORY: Breath sounds equal bilaterally. No accessory muscle use.  Lung 

sounds are clear to auscultation.


GASTROINTESTINAL: Abdomen soft, non-tender, nondistended. 


MUSCULOSKELETAL: No cyanosis.  Right ankle is swollen with dehisced incision 

and some erythema surrounding the incision site.


BACK: Nontender without obvious deformity. No CVA tenderness.





Data


Data


Last Documented VS





Vital Signs








  Date Time  Temp Pulse Resp B/P (MAP) Pulse Ox O2 Delivery O2 Flow Rate FiO2


 


5/29/18 13:01      Room Air  


 


5/29/18 12:44 97.4 72 18 122/65 (84) 98   








Orders





 Orders


Complete Blood Count With Diff (5/29/18 13:07)


Basic Metabolic Panel (Bmp) (5/29/18 13:07)


Tibia/Fibula (Ap/Lat) (5/29/18 )


Wound Culture And Gram Stain (5/29/18 13:07)





Labs





Laboratory Tests








Test


  5/29/18


13:25


 


White Blood Count 7.1 TH/MM3 


 


Red Blood Count 4.80 MIL/MM3 


 


Hemoglobin 14.2 GM/DL 


 


Hematocrit 42.5 % 


 


Mean Corpuscular Volume 88.5 FL 


 


Mean Corpuscular Hemoglobin 29.6 PG 


 


Mean Corpuscular Hemoglobin


Concent 33.5 % 


 


 


Red Cell Distribution Width 14.9 % 


 


Platelet Count 256 TH/MM3 


 


Mean Platelet Volume 8.5 FL 


 


Neutrophils (%) (Auto) 51.6 % 


 


Lymphocytes (%) (Auto) 32.1 % 


 


Monocytes (%) (Auto) 11.9 % 


 


Eosinophils (%) (Auto) 3.0 % 


 


Basophils (%) (Auto) 1.4 % 


 


Neutrophils # (Auto) 3.6 TH/MM3 


 


Lymphocytes # (Auto) 2.3 TH/MM3 


 


Monocytes # (Auto) 0.8 TH/MM3 


 


Eosinophils # (Auto) 0.2 TH/MM3 


 


Basophils # (Auto) 0.1 TH/MM3 


 


CBC Comment DIFF FINAL 


 


Differential Comment  


 


Blood Urea Nitrogen 9 MG/DL 


 


Creatinine 0.93 MG/DL 


 


Random Glucose 94 MG/DL 


 


Calcium Level 8.9 MG/DL 


 


Sodium Level 140 MEQ/L 


 


Potassium Level 4.2 MEQ/L 


 


Chloride Level 104 MEQ/L 


 


Carbon Dioxide Level 29.0 MEQ/L 


 


Anion Gap 7 MEQ/L 


 


Estimat Glomerular Filtration


Rate 89 ML/MIN 


 











MDM


Medical Decision Making


Medical Screen Exam Complete:  Yes


Emergency Medical Condition:  Yes


Medical Record Reviewed:  Yes


Interpretation(s)





Last Impressions








Tibia/Fibula X-Ray 5/29/18 0000 Signed





Impressions: 





 CONCLUSION: 





 Fracture lines remain visualized in the distal tibia and fibula with hardware i





 n place. The hardware demonstrates no finding to indicate failure or loosening.





  The displaced butterfly fragment associated with the distal tibia fracture dem





 onstrates stable to mildly increased displacement.





  





 





  





 





  





 





  





 








Differential Diagnosis


right ankle wound vs. cellulitis vs. osteomyelitis vs. noncompliance


Narrative Course


43 year old male presents to the emergency department for evaluation of right 

ankle wound.  Patient pulled his PICC line out and stopped his antibiotics 1 

week ago.  He has not followed up.  CBC, BMP, wound culture are ordered and 

pending.  X-ray of the right tibia/fibula is ordered and pending.





CBC is unremarkable.  BMP is unremarkable.  X-ray of the right tibia/fibula 

shows fracture lines remain visualized in the distal tibia and fibula with 

hardware in place. The hardware demonstrates no finding to indicate failure or 

loosening. The displaced butterfly fragment associated with the distal tibia 

fracture demonstrates stable to mildly increased displacement.





I discussed the case with my attending physician, Dr. Fraire, who agrees with 

plan and disposition.





Patient will be discharged on oral Bactrim.  He is strongly encouraged to follow

-up with Dr. Hirsch, his surgeon.  He verbalizes agreement and understanding 

that this.  He is to return here for any worsening symptoms.





Diagnosis





 Primary Impression:  


 Wound of right ankle


 Qualified Codes:  S91.001A - Unspecified open wound, right ankle, initial 

encounter


Referrals:  


Davy Hirsch MD


call for appointment


Patient Instructions:  Acute Wound Care (DC), General Instructions





***Additional Instructions:  


Take antibiotic as directed until gone.  This is free at [facility].


Monitor closely.


Follow-up with Dr. Hirsch, orthopedic surgeon.  Please call for an appointment.


Return to the emergency department for any acute worsening of symptoms.


***Med/Other Pt SpecificInfo:  Prescription(s) given


Scripts


Sulfamethoxazole-Trimethoprim (Bactrim DS) 800-160 Mg Tab


1 TAB PO BID for Infection, #20 TAB 0 Refills


   Prov: Sofía Peters         5/29/18


Disposition:  01 DISCHARGE HOME


Condition:  Stable











Sofía Peters May 29, 2018 13:18

## 2018-05-29 NOTE — RADRPT
EXAM DATE:  5/29/2018 1:45 PM EDT

AGE/SEX:        43 years / Male



INDICATIONS:  Right distal tibia/fibula pain after fall. ORIF on March 7,2018.



CLINICAL DATA:  This is the patient's initial encounter. Patient reports that signs and symptoms have
 been present for 2 days and indicates a pain score of 10/10. 

                                                                          

MEDICAL/SURGICAL HISTORY:       None. . Right distal tibia/fibula ORIF.



[place]      [facility] [place].  .



FINDINGS:  

4 views of the right leg demonstrate a lateral fibular sideplate with multiple interlocking screws an
d a lateral distal tibial side plate with multiple interlocking screws. Fracture lines remain visuali
zed. There is a mildly displaced butterfly fragment associated with the distal tibia fracture. The de
gree of displacement is similar to slightly increased from the prior intraoperative study. There is b
ridging ossification of the superior aspect of the butterfly fragment. Hardware demonstrates no findi
ng to indicate failure or loosening. Lucency in the mid tibia is consistent with prior pin tracks. No
 soft tissue abnormality is identified.



CONCLUSION: 

Fracture lines remain visualized in the distal tibia and fibula with hardware in place. The hardware 
demonstrates no finding to indicate failure or loosening. The displaced butterfly fragment associated
 with the distal tibia fracture demonstrates stable to mildly increased displacement.









Electronically signed by: Christopher Brown MD  5/29/2018 2:30 PM EDT

## 2018-06-20 ENCOUNTER — HOSPITAL ENCOUNTER (INPATIENT)
Dept: HOSPITAL 17 - HSDI | Age: 43
LOS: 4 days | Discharge: HOME | DRG: 475 | End: 2018-06-24
Attending: ORTHOPAEDIC SURGERY | Admitting: ORTHOPAEDIC SURGERY
Payer: COMMERCIAL

## 2018-06-20 VITALS — BODY MASS INDEX: 23.7 KG/M2 | WEIGHT: 204.81 LBS | HEIGHT: 78 IN

## 2018-06-20 VITALS
DIASTOLIC BLOOD PRESSURE: 55 MMHG | HEART RATE: 70 BPM | SYSTOLIC BLOOD PRESSURE: 117 MMHG | TEMPERATURE: 97.2 F | OXYGEN SATURATION: 97 % | RESPIRATION RATE: 20 BRPM

## 2018-06-20 VITALS
TEMPERATURE: 97.2 F | HEART RATE: 92 BPM | RESPIRATION RATE: 20 BRPM | SYSTOLIC BLOOD PRESSURE: 102 MMHG | OXYGEN SATURATION: 96 % | DIASTOLIC BLOOD PRESSURE: 64 MMHG

## 2018-06-20 DIAGNOSIS — S82.301K: ICD-10-CM

## 2018-06-20 DIAGNOSIS — Z91.19: ICD-10-CM

## 2018-06-20 DIAGNOSIS — T84.196A: Primary | ICD-10-CM

## 2018-06-20 DIAGNOSIS — F17.200: ICD-10-CM

## 2018-06-20 PROCEDURE — 85014 HEMATOCRIT: CPT

## 2018-06-20 PROCEDURE — 88307 TISSUE EXAM BY PATHOLOGIST: CPT

## 2018-06-20 PROCEDURE — 88311 DECALCIFY TISSUE: CPT

## 2018-06-20 PROCEDURE — 94150 VITAL CAPACITY TEST: CPT

## 2018-06-20 PROCEDURE — 85018 HEMOGLOBIN: CPT

## 2018-06-20 PROCEDURE — 76937 US GUIDE VASCULAR ACCESS: CPT

## 2018-06-20 PROCEDURE — 0Y6H0Z1 DETACHMENT AT RIGHT LOWER LEG, HIGH, OPEN APPROACH: ICD-10-PCS | Performed by: ORTHOPAEDIC SURGERY

## 2018-06-20 RX ADMIN — ONDANSETRON PRN MG: 4 TABLET, ORALLY DISINTEGRATING ORAL at 19:49

## 2018-06-20 RX ADMIN — CEFAZOLIN SODIUM SCH MLS/HR: 2 SOLUTION INTRAVENOUS at 17:36

## 2018-06-20 RX ADMIN — ONDANSETRON PRN MG: 2 INJECTION, SOLUTION INTRAMUSCULAR; INTRAVENOUS at 22:09

## 2018-06-20 RX ADMIN — GABAPENTIN SCH MG: 300 CAPSULE ORAL at 17:35

## 2018-06-20 RX ADMIN — ONDANSETRON PRN MG: 4 TABLET, ORALLY DISINTEGRATING ORAL at 15:17

## 2018-06-20 RX ADMIN — HYDROCODONE BITARTRATE AND ACETAMINOPHEN PRN TAB: 10; 325 TABLET ORAL at 11:15

## 2018-06-20 RX ADMIN — GABAPENTIN SCH MG: 300 CAPSULE ORAL at 11:43

## 2018-06-20 RX ADMIN — ONDANSETRON PRN MG: 4 TABLET, ORALLY DISINTEGRATING ORAL at 11:44

## 2018-06-20 RX ADMIN — HYDROCODONE BITARTRATE AND ACETAMINOPHEN PRN TAB: 10; 325 TABLET ORAL at 15:06

## 2018-06-20 RX ADMIN — HYDROCODONE BITARTRATE AND ACETAMINOPHEN PRN TAB: 10; 325 TABLET ORAL at 22:10

## 2018-06-20 RX ADMIN — HYDROCODONE BITARTRATE AND ACETAMINOPHEN PRN TAB: 10; 325 TABLET ORAL at 19:49

## 2018-06-20 RX ADMIN — MORPHINE SULFATE PRN MG: 2 INJECTION, SOLUTION INTRAMUSCULAR; INTRAVENOUS at 17:37

## 2018-06-20 NOTE — PD.OP
cc:   Davy Bains MD


__________________________________________________





Operative Report


Date of Surgery:  Jun 20, 2018


Preoperative Diagnosis:  


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


Postoperative Diagnosis:  


Procedure:


Right below-knee amputation


Anesthesia:


General


Surgeon:


Davy Bains


Assistant(s):


ZION Rodriguez PA-C


 


The surgical procedure was assisted by my physician assistant. My P.A. presence 

was necessary throughout this case for the manipulation and positioning of the 

surgical extremity. My P.A. was assisting me throughout the duration of this 

procedure. The skill set of a physician assistant was medically necessary to 

complete this procedure. During the surgical case the surgical tech was working 

at the back table and the physician assistant was directly assisting me.


Operation and Findings:


Contreras is well-known to me from previous treatment of a right distal tibia and 

fibular fractures.  He was initially treated with external fixation followed by 

staged open reduction internal fixation. Patient was completely noncompliant 

with postoperative instructions. He continued to smoke. He was full 

weightbearing on his leg at times despite instructions to be nonweightbearing. 

He cut off his own cast. He separately developed wound infection. He developed 

a nonunion and eventually had failure of hardware secondary to noncompliance 

with weightbearing. I had a lengthy discussion with patient preoperatively. I 

discussed with him that his foot and ankle were likely salvageable but would 

likely require multiple surgeries. He stated that he has chronic pain in his 

foot and ankle from prior injuries.  He did not wish to undergo multiple 

surgeries. He was adamant about proceeding with below-knee amputation and did 

not want any further attempts at limb salvage. I discussed with him phantom 

pain.  I also discussed with him the need to stop smoking to allow this wound 

to heal. He understands that it will be at least 3 months before he can be 

fitted for prosthesis.  Informed consent was obtained preoperatively and 

operative site was marked.  Patient was brought to the OR and placed on the OR 

table.   IV sedation and GETA were administered by anesthesia and IV 

antibiotics were given.  The right leg was prepped with alcohol, followed by 

Hibiclens and draped in the usual sterile fashion.  Time out procedure was 

performed. 





The procedure began with a standard incision for below-knee amputation.  A long 

posterior flap was maintained.  The subcutaneous tissue was dissected with 

Bovie.  The tibia and fibula were now exposed and the anterior compartment was 

incised.  At this point, the soft tissue was retracted.  The tibia was cut with 

an oscillating saw.  The fibula was now cut 1 cm shorter than the tibia.  At 

this point, the tourniquet was inflated.  The posterior flap was now incised 

sharply.  The foot was now removed from the field.


 


At this point, attention was turned to hemostasis.  The anterior and posterior 

tibial vessels were identified.  An anterior tibial and posterior tibial artery 

and vein were now ligated with silk suture ties.  The peroneal nerve and 

posterior tibial nerve were identified.  The nerve sheath was injected with a 

0.5% Marcaine with epinephrine for pain relief.  The nerve was transected 

proximal to the tibial cut.    At this point, the deep posterior compartment 

was excised.  The anterior compartment, lateral compartment, and deep posterior 

compartments were all excised at the level of the tibia cut.  The gastroc and 

soleus muscles were left intact.  The edges of the bone were smoothed with a 

rasp. The tourniquet was released and hemostasis was confirmed.  A drain was 

placed deep.  The gastrocsoleus fascia was now sutured to the anterior tibial 

fascia with #1 Vicryl.  The subcutaneous tissues were closed with 3-0 Vicryl 

and skin was closed with 3-0 nylon.  Sterile dressings were applied and CKS was 

applied. The patient was awakened and transferred to recovery in stable 

condition.  Needle and sponge counts were correct.











Davy Bains MD Jun 20, 2018 09:36

## 2018-06-21 VITALS
TEMPERATURE: 98.8 F | DIASTOLIC BLOOD PRESSURE: 69 MMHG | OXYGEN SATURATION: 96 % | SYSTOLIC BLOOD PRESSURE: 127 MMHG | HEART RATE: 77 BPM | RESPIRATION RATE: 18 BRPM

## 2018-06-21 VITALS
SYSTOLIC BLOOD PRESSURE: 116 MMHG | OXYGEN SATURATION: 94 % | HEART RATE: 62 BPM | RESPIRATION RATE: 18 BRPM | DIASTOLIC BLOOD PRESSURE: 67 MMHG | TEMPERATURE: 98.2 F

## 2018-06-21 VITALS
OXYGEN SATURATION: 93 % | DIASTOLIC BLOOD PRESSURE: 66 MMHG | TEMPERATURE: 98.5 F | SYSTOLIC BLOOD PRESSURE: 119 MMHG | HEART RATE: 64 BPM | RESPIRATION RATE: 18 BRPM

## 2018-06-21 VITALS
SYSTOLIC BLOOD PRESSURE: 111 MMHG | DIASTOLIC BLOOD PRESSURE: 62 MMHG | RESPIRATION RATE: 18 BRPM | HEART RATE: 61 BPM | TEMPERATURE: 97.6 F | OXYGEN SATURATION: 97 %

## 2018-06-21 VITALS
HEART RATE: 51 BPM | DIASTOLIC BLOOD PRESSURE: 61 MMHG | SYSTOLIC BLOOD PRESSURE: 122 MMHG | TEMPERATURE: 97.7 F | RESPIRATION RATE: 18 BRPM | OXYGEN SATURATION: 98 %

## 2018-06-21 VITALS
OXYGEN SATURATION: 95 % | SYSTOLIC BLOOD PRESSURE: 131 MMHG | DIASTOLIC BLOOD PRESSURE: 69 MMHG | HEART RATE: 75 BPM | RESPIRATION RATE: 18 BRPM | TEMPERATURE: 98.5 F

## 2018-06-21 LAB
HCT VFR BLD CALC: 38.9 % (ref 39–51)
HGB BLD-MCNC: 13 GM/DL (ref 13–17)

## 2018-06-21 RX ADMIN — ONDANSETRON PRN MG: 4 TABLET, ORALLY DISINTEGRATING ORAL at 01:50

## 2018-06-21 RX ADMIN — CEFAZOLIN SODIUM SCH MLS/HR: 2 SOLUTION INTRAVENOUS at 09:30

## 2018-06-21 RX ADMIN — ONDANSETRON PRN MG: 2 INJECTION, SOLUTION INTRAMUSCULAR; INTRAVENOUS at 13:46

## 2018-06-21 RX ADMIN — HYDROCODONE BITARTRATE AND ACETAMINOPHEN PRN TAB: 10; 325 TABLET ORAL at 09:29

## 2018-06-21 RX ADMIN — HYDROCODONE BITARTRATE AND ACETAMINOPHEN PRN TAB: 10; 325 TABLET ORAL at 06:48

## 2018-06-21 RX ADMIN — ONDANSETRON PRN MG: 4 TABLET, ORALLY DISINTEGRATING ORAL at 20:32

## 2018-06-21 RX ADMIN — GABAPENTIN SCH MG: 300 CAPSULE ORAL at 09:29

## 2018-06-21 RX ADMIN — GABAPENTIN SCH MG: 300 CAPSULE ORAL at 18:00

## 2018-06-21 RX ADMIN — HYDROCODONE BITARTRATE AND ACETAMINOPHEN PRN TAB: 10; 325 TABLET ORAL at 03:58

## 2018-06-21 RX ADMIN — MORPHINE SULFATE PRN MG: 2 INJECTION, SOLUTION INTRAMUSCULAR; INTRAVENOUS at 10:25

## 2018-06-21 RX ADMIN — MORPHINE SULFATE PRN MG: 2 INJECTION, SOLUTION INTRAMUSCULAR; INTRAVENOUS at 05:53

## 2018-06-21 RX ADMIN — CEFAZOLIN SODIUM SCH MLS/HR: 2 SOLUTION INTRAVENOUS at 15:59

## 2018-06-21 RX ADMIN — GABAPENTIN SCH MG: 300 CAPSULE ORAL at 13:47

## 2018-06-21 RX ADMIN — MORPHINE SULFATE PRN MG: 2 INJECTION, SOLUTION INTRAMUSCULAR; INTRAVENOUS at 21:28

## 2018-06-21 RX ADMIN — HYDROCODONE BITARTRATE AND ACETAMINOPHEN PRN TAB: 10; 325 TABLET ORAL at 20:13

## 2018-06-21 RX ADMIN — VANCOMYCIN HYDROCHLORIDE SCH MLS/HR: 1 INJECTION, SOLUTION INTRAVENOUS at 09:34

## 2018-06-21 RX ADMIN — MORPHINE SULFATE PRN MG: 2 INJECTION, SOLUTION INTRAMUSCULAR; INTRAVENOUS at 01:44

## 2018-06-21 RX ADMIN — HYDROCODONE BITARTRATE AND ACETAMINOPHEN PRN TAB: 10; 325 TABLET ORAL at 00:53

## 2018-06-21 RX ADMIN — HYDROCODONE BITARTRATE AND ACETAMINOPHEN PRN TAB: 10; 325 TABLET ORAL at 15:57

## 2018-06-21 RX ADMIN — CEFAZOLIN SODIUM SCH MLS/HR: 2 SOLUTION INTRAVENOUS at 00:54

## 2018-06-21 RX ADMIN — ONDANSETRON PRN MG: 4 TABLET, ORALLY DISINTEGRATING ORAL at 05:32

## 2018-06-21 RX ADMIN — MORPHINE SULFATE PRN MG: 2 INJECTION, SOLUTION INTRAMUSCULAR; INTRAVENOUS at 13:47

## 2018-06-21 NOTE — HHI.FF
Face to Face Verification


Diagnosis:  


(1) Status post below knee amputation of right lower extremity


Physical Therapy


Gait training, Safety evaluation


Right LE Weight Bearing:  Non WB





Nursing


Dressing Changes:  Daily dressing change, Ace wrap, 4x4s, Xeroform


Additional Instructions


Maintain knee immobilizer











I have seen patient Contreras Lawler on 6/21/18. My clinical findings support 

the need for the requested home health care services because:








 Limited ability to care for self














I certify that my clinical findings support that this patient is homebound 

because:








 Post-op weakness

















Kristopher Romo Jr. PA Jun 21, 2018 06:47

## 2018-06-21 NOTE — OTSOAPIP
TIME SESSION COMPLETED:  1400



TREATMENT TIME:  0    MINS.

CHART REVIEWED.  



INTERDISCIPLINARY COMMUNICATION: NURSE ART REQUEST TO HOLD TREATMENT SECONDARY TO PATIENT 
BEING AGITATED REGARDING HIS PRESENT MEDICAL SITUATION 



PLAN: WILL SEE PATIENT NEXT TREATMENT DAY



Therapist: HEATHER YOU/ALEXANDRA

                          Signature on file

## 2018-06-21 NOTE — PD.ORT.PN
Subjective


Subjective Remarks


Resting comfortably status post a 1 right below-knee amputation





Objective


Vitals





Vital Signs








  Date Time  Temp Pulse Resp B/P (MAP) Pulse Ox O2 Delivery O2 Flow Rate FiO2


 


6/21/18 04:00 97.6 61 18 111/62 (78) 97   


 


6/21/18 00:00 97.7 51 18 122/61 (81) 98   


 


6/20/18 16:28 97.2 92 20 102/64 (77) 96   


 


6/20/18 12:34 97.2 70 20 117/55 (75) 97   


 


6/20/18 11:15 98.1 78 16 125/77 (93) 98 Nasal Cannula 2 


 


6/20/18 11:00  71 16 122/78 (93) 98 Nasal Cannula 2 


 


6/20/18 10:45  73 16 140/60 (86) 99 Nasal Cannula 2 


 


6/20/18 10:30  76 16 137/81 (99) 99 Nasal Cannula 2 


 


6/20/18 10:15  76 16 133/78 (96) 98 Nasal Cannula 2 


 


6/20/18 10:00  77 16 113/58 (76) 99 Nasal Cannula 2 


 


6/20/18 09:58 97.5 81 16 132/76 (94) 96 Nasal Cannula 2 














I/O      


 


 6/20/18 6/20/18 6/20/18 6/21/18 6/21/18 6/21/18





 07:00 15:00 23:00 07:00 15:00 23:00


 


Intake Total  700 ml    


 


Output Total  200 ml    


 


Balance  500 ml    


 


      


 


Other  700 ml    


 


Output Estimated Blood Loss  200 ml    


 


# Voids   2 2  








Objective Remarks


Right lower extremity: No pain with hip range of motion.  Knee immobilizer in 

place.  Clean dry dressings intact with drain in place.  Minimal drainage noted 

in drain receptacle





Assessment & Plan


Assessment and Plan


Right below-knee amputation POD 1


Maintain dressing and knee immobilizer.  Drain care.


We will plan on doing a dressing change bedside tomorrow a.m. and will require 

Xeroform, 4 x 4's, ABDs


Smoking cessation is discussed and understands it is best chance of healing 

this incision is to remain tobacco and nicotine free.  He states he is in 

agreement


We will plan on possible discharge on Saturday


Follow-up with Dr. Hirsch or PA in 2 weeks











Kristopher Romo Jr. Jun 21, 2018 06:44

## 2018-06-22 VITALS
TEMPERATURE: 97.8 F | HEART RATE: 62 BPM | RESPIRATION RATE: 16 BRPM | OXYGEN SATURATION: 96 % | SYSTOLIC BLOOD PRESSURE: 110 MMHG | DIASTOLIC BLOOD PRESSURE: 63 MMHG

## 2018-06-22 VITALS
TEMPERATURE: 98.3 F | HEART RATE: 74 BPM | OXYGEN SATURATION: 96 % | SYSTOLIC BLOOD PRESSURE: 126 MMHG | RESPIRATION RATE: 18 BRPM | DIASTOLIC BLOOD PRESSURE: 67 MMHG

## 2018-06-22 VITALS
OXYGEN SATURATION: 95 % | DIASTOLIC BLOOD PRESSURE: 71 MMHG | TEMPERATURE: 98.1 F | RESPIRATION RATE: 18 BRPM | SYSTOLIC BLOOD PRESSURE: 108 MMHG | HEART RATE: 62 BPM

## 2018-06-22 VITALS
RESPIRATION RATE: 16 BRPM | SYSTOLIC BLOOD PRESSURE: 121 MMHG | DIASTOLIC BLOOD PRESSURE: 71 MMHG | HEART RATE: 62 BPM | OXYGEN SATURATION: 93 % | TEMPERATURE: 97.9 F

## 2018-06-22 VITALS
HEART RATE: 78 BPM | RESPIRATION RATE: 18 BRPM | TEMPERATURE: 98.5 F | SYSTOLIC BLOOD PRESSURE: 130 MMHG | DIASTOLIC BLOOD PRESSURE: 63 MMHG | OXYGEN SATURATION: 92 %

## 2018-06-22 VITALS
SYSTOLIC BLOOD PRESSURE: 123 MMHG | DIASTOLIC BLOOD PRESSURE: 65 MMHG | HEART RATE: 77 BPM | OXYGEN SATURATION: 96 % | RESPIRATION RATE: 18 BRPM | TEMPERATURE: 97.8 F

## 2018-06-22 RX ADMIN — ONDANSETRON PRN MG: 4 TABLET, ORALLY DISINTEGRATING ORAL at 00:52

## 2018-06-22 RX ADMIN — GABAPENTIN SCH MG: 300 CAPSULE ORAL at 10:01

## 2018-06-22 RX ADMIN — IBUPROFEN SCH MG: 600 TABLET, FILM COATED ORAL at 22:23

## 2018-06-22 RX ADMIN — GABAPENTIN SCH MG: 300 CAPSULE ORAL at 13:17

## 2018-06-22 RX ADMIN — OXYCODONE HYDROCHLORIDE AND ACETAMINOPHEN PRN TAB: 10; 325 TABLET ORAL at 17:26

## 2018-06-22 RX ADMIN — VANCOMYCIN HYDROCHLORIDE SCH MLS/HR: 1 INJECTION, SOLUTION INTRAVENOUS at 10:03

## 2018-06-22 RX ADMIN — ONDANSETRON PRN MG: 4 TABLET, ORALLY DISINTEGRATING ORAL at 06:33

## 2018-06-22 RX ADMIN — HYDROCODONE BITARTRATE AND ACETAMINOPHEN PRN TAB: 10; 325 TABLET ORAL at 05:15

## 2018-06-22 RX ADMIN — IBUPROFEN SCH MG: 600 TABLET, FILM COATED ORAL at 13:17

## 2018-06-22 RX ADMIN — OXYCODONE HYDROCHLORIDE AND ACETAMINOPHEN PRN TAB: 10; 325 TABLET ORAL at 10:01

## 2018-06-22 RX ADMIN — MORPHINE SULFATE PRN MG: 2 INJECTION, SOLUTION INTRAMUSCULAR; INTRAVENOUS at 06:33

## 2018-06-22 RX ADMIN — CEFAZOLIN SODIUM SCH MLS/HR: 2 SOLUTION INTRAVENOUS at 00:49

## 2018-06-22 RX ADMIN — GABAPENTIN SCH MG: 300 CAPSULE ORAL at 17:24

## 2018-06-22 RX ADMIN — IBUPROFEN SCH MG: 600 TABLET, FILM COATED ORAL at 07:03

## 2018-06-22 RX ADMIN — MORPHINE SULFATE PRN MG: 2 INJECTION, SOLUTION INTRAMUSCULAR; INTRAVENOUS at 00:49

## 2018-06-22 RX ADMIN — CEFAZOLIN SODIUM SCH MLS/HR: 2 SOLUTION INTRAVENOUS at 09:00

## 2018-06-22 RX ADMIN — MORPHINE SULFATE PRN MG: 2 INJECTION, SOLUTION INTRAMUSCULAR; INTRAVENOUS at 22:25

## 2018-06-23 VITALS
TEMPERATURE: 98.9 F | SYSTOLIC BLOOD PRESSURE: 88 MMHG | RESPIRATION RATE: 16 BRPM | OXYGEN SATURATION: 96 % | DIASTOLIC BLOOD PRESSURE: 52 MMHG | HEART RATE: 100 BPM

## 2018-06-23 VITALS
SYSTOLIC BLOOD PRESSURE: 113 MMHG | HEART RATE: 59 BPM | OXYGEN SATURATION: 94 % | DIASTOLIC BLOOD PRESSURE: 66 MMHG | TEMPERATURE: 98.2 F | RESPIRATION RATE: 16 BRPM

## 2018-06-23 VITALS
HEART RATE: 72 BPM | SYSTOLIC BLOOD PRESSURE: 120 MMHG | OXYGEN SATURATION: 96 % | TEMPERATURE: 97.7 F | RESPIRATION RATE: 18 BRPM | DIASTOLIC BLOOD PRESSURE: 78 MMHG

## 2018-06-23 VITALS
RESPIRATION RATE: 17 BRPM | HEART RATE: 72 BPM | TEMPERATURE: 98.3 F | SYSTOLIC BLOOD PRESSURE: 127 MMHG | OXYGEN SATURATION: 97 % | DIASTOLIC BLOOD PRESSURE: 71 MMHG

## 2018-06-23 VITALS
DIASTOLIC BLOOD PRESSURE: 66 MMHG | OXYGEN SATURATION: 96 % | RESPIRATION RATE: 14 BRPM | HEART RATE: 62 BPM | TEMPERATURE: 98.3 F | SYSTOLIC BLOOD PRESSURE: 105 MMHG

## 2018-06-23 VITALS — DIASTOLIC BLOOD PRESSURE: 58 MMHG | SYSTOLIC BLOOD PRESSURE: 102 MMHG

## 2018-06-23 RX ADMIN — IBUPROFEN SCH MG: 600 TABLET, FILM COATED ORAL at 21:49

## 2018-06-23 RX ADMIN — GABAPENTIN SCH MG: 300 CAPSULE ORAL at 16:48

## 2018-06-23 RX ADMIN — MORPHINE SULFATE PRN MG: 2 INJECTION, SOLUTION INTRAMUSCULAR; INTRAVENOUS at 06:14

## 2018-06-23 RX ADMIN — OXYCODONE HYDROCHLORIDE AND ACETAMINOPHEN PRN TAB: 10; 325 TABLET ORAL at 00:50

## 2018-06-23 RX ADMIN — OXYCODONE HYDROCHLORIDE AND ACETAMINOPHEN PRN TAB: 10; 325 TABLET ORAL at 18:09

## 2018-06-23 RX ADMIN — OXYCODONE HYDROCHLORIDE AND ACETAMINOPHEN PRN TAB: 10; 325 TABLET ORAL at 15:31

## 2018-06-23 RX ADMIN — DOCUSATE SODIUM SCH MG: 100 CAPSULE, LIQUID FILLED ORAL at 21:50

## 2018-06-23 RX ADMIN — MORPHINE SULFATE PRN MG: 2 INJECTION, SOLUTION INTRAMUSCULAR; INTRAVENOUS at 09:52

## 2018-06-23 RX ADMIN — OXYCODONE HYDROCHLORIDE AND ACETAMINOPHEN PRN TAB: 10; 325 TABLET ORAL at 07:45

## 2018-06-23 RX ADMIN — GABAPENTIN SCH MG: 300 CAPSULE ORAL at 07:45

## 2018-06-23 RX ADMIN — MORPHINE SULFATE PRN MG: 2 INJECTION, SOLUTION INTRAMUSCULAR; INTRAVENOUS at 16:48

## 2018-06-23 RX ADMIN — ONDANSETRON PRN MG: 4 TABLET, ORALLY DISINTEGRATING ORAL at 06:14

## 2018-06-23 RX ADMIN — GABAPENTIN SCH MG: 300 CAPSULE ORAL at 13:15

## 2018-06-23 RX ADMIN — OXYCODONE HYDROCHLORIDE AND ACETAMINOPHEN PRN TAB: 10; 325 TABLET ORAL at 11:06

## 2018-06-23 RX ADMIN — MORPHINE SULFATE PRN MG: 2 INJECTION, SOLUTION INTRAMUSCULAR; INTRAVENOUS at 18:56

## 2018-06-23 RX ADMIN — IBUPROFEN SCH MG: 600 TABLET, FILM COATED ORAL at 06:14

## 2018-06-23 RX ADMIN — IBUPROFEN SCH MG: 600 TABLET, FILM COATED ORAL at 13:15

## 2018-06-23 RX ADMIN — MORPHINE SULFATE PRN MG: 2 INJECTION, SOLUTION INTRAMUSCULAR; INTRAVENOUS at 23:05

## 2018-06-23 RX ADMIN — OXYCODONE HYDROCHLORIDE AND ACETAMINOPHEN PRN TAB: 10; 325 TABLET ORAL at 21:50

## 2018-06-23 RX ADMIN — ONDANSETRON PRN MG: 4 TABLET, ORALLY DISINTEGRATING ORAL at 23:12

## 2018-06-23 NOTE — PD.ORT.PN
Subjective


Subjective Remarks


No new complaints of pain.





Objective


Vitals





Vital Signs








  Date Time  Temp Pulse Resp B/P (MAP) Pulse Ox O2 Delivery O2 Flow Rate FiO2


 


6/23/18 08:20 98.2 59 16 113/66 (82) 94   


 


6/23/18 05:08 98.3 62 14 105/66 (79) 96   


 


6/22/18 20:00 97.8 77 18 123/65 (84) 96   


 


6/22/18 17:19 98.1 62 18 108/71 (83) 95   














I/O      


 


 6/22/18 6/22/18 6/22/18 6/23/18 6/23/18 6/23/18





 07:00 15:00 23:00 07:00 15:00 23:00


 


Intake Total 50 ml 300 ml    


 


Balance 50 ml 300 ml    


 


      


 


Intake IV Total 50 ml 300 ml    








Result Diagram:  


6/21/18 0829





Objective Remarks


Right lower extremity: Leg is elevated.  Knee immobilizer in place.  Dressings 

are intact and dry.





Assessment & Plan


Assessment and Plan


Right below-knee amputation POD 3





Continue knee immobilizer


Percocet 10, ibuprofen and gabapentin


We will continue to follow pain management and plan for discharge once pain is 

controlled


Potential discharge on Sunday or Monday


Due to funds dressing changes will need to be arranged prior to discharge.  

Supplies given for 21 days 


Follow-up with Dr. Hirsch or PA in 2 weeks











Sterling Barrett MD Jun 23, 2018 12:11

## 2018-06-24 VITALS
OXYGEN SATURATION: 97 % | DIASTOLIC BLOOD PRESSURE: 80 MMHG | HEART RATE: 66 BPM | SYSTOLIC BLOOD PRESSURE: 131 MMHG | TEMPERATURE: 98 F | RESPIRATION RATE: 18 BRPM

## 2018-06-24 VITALS
RESPIRATION RATE: 18 BRPM | DIASTOLIC BLOOD PRESSURE: 89 MMHG | TEMPERATURE: 98 F | HEART RATE: 76 BPM | SYSTOLIC BLOOD PRESSURE: 129 MMHG | OXYGEN SATURATION: 99 %

## 2018-06-24 VITALS
RESPIRATION RATE: 20 BRPM | DIASTOLIC BLOOD PRESSURE: 73 MMHG | TEMPERATURE: 97.7 F | SYSTOLIC BLOOD PRESSURE: 120 MMHG | HEART RATE: 64 BPM | OXYGEN SATURATION: 96 %

## 2018-06-24 RX ADMIN — OXYCODONE HYDROCHLORIDE AND ACETAMINOPHEN PRN TAB: 10; 325 TABLET ORAL at 04:45

## 2018-06-24 RX ADMIN — GABAPENTIN SCH MG: 300 CAPSULE ORAL at 09:11

## 2018-06-24 RX ADMIN — IBUPROFEN SCH MG: 600 TABLET, FILM COATED ORAL at 05:25

## 2018-06-24 RX ADMIN — OXYCODONE HYDROCHLORIDE AND ACETAMINOPHEN PRN TAB: 10; 325 TABLET ORAL at 08:15

## 2018-06-24 RX ADMIN — MORPHINE SULFATE PRN MG: 2 INJECTION, SOLUTION INTRAMUSCULAR; INTRAVENOUS at 03:05

## 2018-06-24 RX ADMIN — OXYCODONE HYDROCHLORIDE AND ACETAMINOPHEN PRN TAB: 10; 325 TABLET ORAL at 01:12

## 2018-06-24 RX ADMIN — ONDANSETRON PRN MG: 4 TABLET, ORALLY DISINTEGRATING ORAL at 03:05

## 2018-06-24 RX ADMIN — DOCUSATE SODIUM SCH MG: 100 CAPSULE, LIQUID FILLED ORAL at 09:11

## 2018-06-24 NOTE — PD.ORT.PN
Subjective


Post Op Day #:  4


Subjective Remarks


pain tolerable.  ready to go home.





Objective


Vitals





Vital Signs








  Date Time  Temp Pulse Resp B/P (MAP) Pulse Ox O2 Delivery O2 Flow Rate FiO2


 


6/24/18 04:44 98.0 66 18 131/80 (97) 97   


 


6/24/18 00:55 98.0 76 18 129/89 (102) 99   


 


6/23/18 19:46 97.7 72 18 120/78 (92) 96   


 


6/23/18 16:00 98.3 72 17 127/71 (89) 97   


 


6/23/18 13:13    102/58 (73)    


 


6/23/18 12:00 98.9 100 16 88/52 (64) 96   














I/O      


 


 6/23/18 6/23/18 6/23/18 6/24/18 6/24/18 6/24/18





 07:00 15:00 23:00 07:00 15:00 23:00


 


      


 


# Voids    2  








Result Diagram:  


6/21/18 0829





Objective Remarks


Right lower extremity: Leg is elevated.  Knee immobilizer in place.  Dressings 

are intact and dry.





Assessment & Plan


Ortho Post Op Day #:  4


Problem List:  


Assessment and Plan


Right below-knee amputation POD 4





Continue knee immobilizer


Percocet 10, ibuprofen and gabapentin


We will continue to follow pain management and plan for discharge once pain is 

controlled - cleared by ortho 


Potential discharge on Sunday - patient would like to be d/c today


Due to funds dressing changes will need to be arranged prior to discharge.  

Supplies given for 21 days 


Follow-up with Dr. Hirsch or PA in 2 weeks











Krishan Zambrano Jun 24, 2018 08:33